# Patient Record
Sex: FEMALE | Race: BLACK OR AFRICAN AMERICAN | NOT HISPANIC OR LATINO | Employment: OTHER | URBAN - METROPOLITAN AREA
[De-identification: names, ages, dates, MRNs, and addresses within clinical notes are randomized per-mention and may not be internally consistent; named-entity substitution may affect disease eponyms.]

---

## 2018-01-01 ENCOUNTER — TELEPHONE (OUTPATIENT)
Dept: SURGERY | Facility: CLINIC | Age: 78
End: 2018-01-01

## 2018-01-01 ENCOUNTER — CLINICAL SUPPORT (OUTPATIENT)
Dept: CARDIOLOGY | Facility: CLINIC | Age: 78
DRG: 064 | End: 2018-01-01
Attending: PSYCHIATRY & NEUROLOGY
Payer: MEDICARE

## 2018-01-01 ENCOUNTER — HOSPITAL ENCOUNTER (INPATIENT)
Facility: HOSPITAL | Age: 78
LOS: 3 days | DRG: 064 | End: 2018-11-22
Attending: PSYCHIATRY & NEUROLOGY | Admitting: PSYCHIATRY & NEUROLOGY
Payer: MEDICARE

## 2018-01-01 VITALS
RESPIRATION RATE: 17 BRPM | HEIGHT: 65 IN | BODY MASS INDEX: 31.22 KG/M2 | SYSTOLIC BLOOD PRESSURE: 90 MMHG | HEART RATE: 107 BPM | TEMPERATURE: 98 F | WEIGHT: 187.38 LBS | DIASTOLIC BLOOD PRESSURE: 51 MMHG | OXYGEN SATURATION: 100 %

## 2018-01-01 DIAGNOSIS — J96.01 ACUTE HYPOXEMIC RESPIRATORY FAILURE: ICD-10-CM

## 2018-01-01 DIAGNOSIS — R56.9 SEIZURES: ICD-10-CM

## 2018-01-01 DIAGNOSIS — I63.89 ARTERIAL ISCHEMIC STROKE, MULTIFOCAL, MULTIPLE VASCULAR TERRITORIES, ACUTE: ICD-10-CM

## 2018-01-01 DIAGNOSIS — R79.89 ELEVATED TROPONIN: ICD-10-CM

## 2018-01-01 DIAGNOSIS — R56.9 SEIZURE: ICD-10-CM

## 2018-01-01 LAB
ACANTHOCYTES BLD QL SMEAR: PRESENT
ALBUMIN SERPL BCP-MCNC: 1.2 G/DL
ALBUMIN SERPL BCP-MCNC: 1.5 G/DL
ALLENS TEST: ABNORMAL
ALLENS TEST: ABNORMAL
ALP SERPL-CCNC: 70 U/L
ALP SERPL-CCNC: 72 U/L
ALT SERPL W/O P-5'-P-CCNC: 55 U/L
ALT SERPL W/O P-5'-P-CCNC: 98 U/L
AMORPH CRY UR QL COMP ASSIST: ABNORMAL
AMYLASE SERPL-CCNC: 141 U/L
ANION GAP SERPL CALC-SCNC: 12 MMOL/L
ANION GAP SERPL CALC-SCNC: 13 MMOL/L
ANISOCYTOSIS BLD QL SMEAR: SLIGHT
APTT BLDCRRT: 35.2 SEC
APTT BLDCRRT: 39.7 SEC
ASCENDING AORTA: 3.04 CM
AST SERPL-CCNC: 103 U/L
AST SERPL-CCNC: 138 U/L
AV INDEX (PROSTH): 0.85
AV MEAN GRADIENT: 2.17 MMHG
AV PEAK GRADIENT: 4 MMHG
AV VALVE AREA: 2.29 CM2
BACTERIA #/AREA URNS AUTO: ABNORMAL /HPF
BASO STIPL BLD QL SMEAR: ABNORMAL
BASOPHILS # BLD AUTO: 0.09 K/UL
BASOPHILS # BLD AUTO: 0.09 K/UL
BASOPHILS NFR BLD: 0 %
BASOPHILS NFR BLD: 0.6 %
BASOPHILS NFR BLD: 0.8 %
BILIRUB SERPL-MCNC: 1.5 MG/DL
BILIRUB SERPL-MCNC: 1.7 MG/DL
BILIRUB UR QL STRIP: NEGATIVE
BSA FOR ECHO PROCEDURE: 1.97 M2
BUN SERPL-MCNC: 43 MG/DL
BUN SERPL-MCNC: 48 MG/DL
BURR CELLS BLD QL SMEAR: ABNORMAL
CALCIUM SERPL-MCNC: 7.9 MG/DL
CALCIUM SERPL-MCNC: 8.1 MG/DL
CHLORIDE SERPL-SCNC: 120 MMOL/L
CHLORIDE SERPL-SCNC: 124 MMOL/L
CK MB SERPL-MCNC: 18.1 NG/ML
CK MB SERPL-MCNC: 21.5 NG/ML
CK MB SERPL-RTO: 0.8 %
CK MB SERPL-RTO: 1 %
CK SERPL-CCNC: 1310 U/L
CK SERPL-CCNC: 2165 U/L
CK SERPL-CCNC: 2188 U/L
CLARITY UR REFRACT.AUTO: ABNORMAL
CO2 SERPL-SCNC: 13 MMOL/L
CO2 SERPL-SCNC: 14 MMOL/L
COLOR UR AUTO: ABNORMAL
CORTIS SERPL-MCNC: 53.1 UG/DL
CREAT SERPL-MCNC: 2.2 MG/DL
CREAT SERPL-MCNC: 2.3 MG/DL
CV ECHO LV RWT: 0.35 CM
DELSYS: ABNORMAL
DELSYS: ABNORMAL
DIFFERENTIAL METHOD: ABNORMAL
DOHLE BOD BLD QL SMEAR: PRESENT
DOP CALC AO PEAK VEL: 1 M/S
DOP CALC AO VTI: 14.68 CM
DOP CALC LVOT AREA: 2.69 CM2
DOP CALC LVOT DIAMETER: 1.85 CM
DOP CALC LVOT STROKE VOLUME: 33.56 CM3
DOP CALCLVOT PEAK VEL VTI: 12.49 CM
ECHO LV POSTERIOR WALL: 0.82 CM (ref 0.6–1.1)
EOSINOPHIL # BLD AUTO: 0 K/UL
EOSINOPHIL # BLD AUTO: 0 K/UL
EOSINOPHIL NFR BLD: 0 %
ERYTHROCYTE [DISTWIDTH] IN BLOOD BY AUTOMATED COUNT: 15.9 %
ERYTHROCYTE [DISTWIDTH] IN BLOOD BY AUTOMATED COUNT: 16 %
ERYTHROCYTE [DISTWIDTH] IN BLOOD BY AUTOMATED COUNT: 16.4 %
ERYTHROCYTE [SEDIMENTATION RATE] IN BLOOD BY WESTERGREN METHOD: 14 MM/H
ERYTHROCYTE [SEDIMENTATION RATE] IN BLOOD BY WESTERGREN METHOD: 14 MM/H
EST. GFR  (AFRICAN AMERICAN): 22.5 ML/MIN/1.73 M^2
EST. GFR  (AFRICAN AMERICAN): 23.7 ML/MIN/1.73 M^2
EST. GFR  (NON AFRICAN AMERICAN): 19.5 ML/MIN/1.73 M^2
EST. GFR  (NON AFRICAN AMERICAN): 20.6 ML/MIN/1.73 M^2
FIBRINOGEN PPP-MCNC: 650 MG/DL
FIO2: 35
FIO2: 35
FOLATE SERPL-MCNC: 7.7 NG/ML
FRACTIONAL SHORTENING: 18 % (ref 28–44)
GIANT PLATELETS BLD QL SMEAR: PRESENT
GLUCOSE SERPL-MCNC: 206 MG/DL
GLUCOSE SERPL-MCNC: 93 MG/DL
GLUCOSE UR QL STRIP: ABNORMAL
GRAN CASTS UR QL COMP ASSIST: 0 /LPF
HAPTOGLOB SERPL-MCNC: 252 MG/DL
HCO3 UR-SCNC: 15.3 MMOL/L (ref 24–28)
HCO3 UR-SCNC: 15.4 MMOL/L (ref 24–28)
HCT VFR BLD AUTO: 35.2 %
HCT VFR BLD AUTO: 37.5 %
HCT VFR BLD AUTO: 37.8 %
HEPARIN INDUCED THROMBOCYTOPENIA: NORMAL
HGB BLD-MCNC: 12.3 G/DL
HGB BLD-MCNC: 12.8 G/DL
HGB BLD-MCNC: 12.8 G/DL
HGB UR QL STRIP: ABNORMAL
HYALINE CASTS UR QL AUTO: 1 /LPF
HYPOCHROMIA BLD QL SMEAR: ABNORMAL
IMM GRANULOCYTES # BLD AUTO: 0.1 K/UL
IMM GRANULOCYTES # BLD AUTO: 0.14 K/UL
IMM GRANULOCYTES # BLD AUTO: ABNORMAL K/UL
IMM GRANULOCYTES NFR BLD AUTO: 0.9 %
IMM GRANULOCYTES NFR BLD AUTO: 1 %
IMM GRANULOCYTES NFR BLD AUTO: ABNORMAL %
INR PPP: 1.2
INR PPP: 1.3
INTERVENTRICULAR SEPTUM: 0.9 CM (ref 0.6–1.1)
KETONES UR QL STRIP: NEGATIVE
LA MAJOR: 4.99 CM
LA MINOR: 4.73 CM
LA WIDTH: 3.61 CM
LACTATE SERPL-SCNC: 2.6 MMOL/L
LACTATE SERPL-SCNC: 2.8 MMOL/L
LDH SERPL L TO P-CCNC: 627 U/L
LEFT ATRIUM SIZE: 3.48 CM
LEFT ATRIUM VOLUME INDEX: 26.3 ML/M2
LEFT ATRIUM VOLUME: 51.86 CM3
LEFT INTERNAL DIMENSION IN SYSTOLE: 3.85 CM (ref 2.1–4)
LEFT VENTRICLE DIASTOLIC VOLUME INDEX: 37.92 ML/M2
LEFT VENTRICLE DIASTOLIC VOLUME: 74.71 ML
LEFT VENTRICLE MASS INDEX: 68.2 G/M2
LEFT VENTRICLE SYSTOLIC VOLUME INDEX: 32.5 ML/M2
LEFT VENTRICLE SYSTOLIC VOLUME: 63.97 ML
LEFT VENTRICULAR INTERNAL DIMENSION IN DIASTOLE: 4.7 CM (ref 3.5–6)
LEFT VENTRICULAR MASS: 134.37 G
LEUKOCYTE ESTERASE UR QL STRIP: ABNORMAL
LIPASE SERPL-CCNC: 13 U/L
LYMPHOCYTES # BLD AUTO: 0.4 K/UL
LYMPHOCYTES # BLD AUTO: 0.5 K/UL
LYMPHOCYTES NFR BLD: 3 %
LYMPHOCYTES NFR BLD: 4.3 %
LYMPHOCYTES NFR BLD: 9 %
MAGNESIUM SERPL-MCNC: 2.3 MG/DL
MAGNESIUM SERPL-MCNC: 2.3 MG/DL
MCH RBC QN AUTO: 29.7 PG
MCH RBC QN AUTO: 29.8 PG
MCH RBC QN AUTO: 30.9 PG
MCHC RBC AUTO-ENTMCNC: 33.9 G/DL
MCHC RBC AUTO-ENTMCNC: 34.1 G/DL
MCHC RBC AUTO-ENTMCNC: 34.9 G/DL
MCV RBC AUTO: 87 FL
MCV RBC AUTO: 88 FL
MCV RBC AUTO: 88 FL
MICROSCOPIC COMMENT: ABNORMAL
MODE: ABNORMAL
MODE: ABNORMAL
MONOCYTES # BLD AUTO: 0.5 K/UL
MONOCYTES # BLD AUTO: 0.6 K/UL
MONOCYTES NFR BLD: 3 %
MONOCYTES NFR BLD: 4 %
MONOCYTES NFR BLD: 4.4 %
NEUTROPHILS # BLD AUTO: 10 K/UL
NEUTROPHILS # BLD AUTO: 12.8 K/UL
NEUTROPHILS NFR BLD: 80 %
NEUTROPHILS NFR BLD: 89.6 %
NEUTROPHILS NFR BLD: 91.4 %
NEUTS BAND NFR BLD MANUAL: 8 %
NITRITE UR QL STRIP: NEGATIVE
NRBC BLD-RTO: 3 /100 WBC
OVALOCYTES BLD QL SMEAR: ABNORMAL
OVALOCYTES BLD QL SMEAR: ABNORMAL
PCO2 BLDA: 24.4 MMHG (ref 35–45)
PCO2 BLDA: 26.5 MMHG (ref 35–45)
PEEP: 5
PEEP: 5
PH SMN: 7.37 [PH] (ref 7.35–7.45)
PH SMN: 7.41 [PH] (ref 7.35–7.45)
PH UR STRIP: 5 [PH] (ref 5–8)
PHOSPHATE SERPL-MCNC: 4.4 MG/DL
PHOSPHATE SERPL-MCNC: 5.1 MG/DL
PLATELET # BLD AUTO: 61 K/UL
PLATELET # BLD AUTO: 67 K/UL
PLATELET # BLD AUTO: 95 K/UL
PLATELET BLD QL SMEAR: ABNORMAL
PMV BLD AUTO: 11.7 FL
PMV BLD AUTO: 12.5 FL
PMV BLD AUTO: 12.6 FL
PO2 BLDA: 131 MMHG (ref 80–100)
PO2 BLDA: 139 MMHG (ref 80–100)
POC BE: -10 MMOL/L
POC BE: -9 MMOL/L
POC SATURATED O2: 99 % (ref 95–100)
POC SATURATED O2: 99 % (ref 95–100)
POC TCO2: 16 MMOL/L (ref 23–27)
POC TCO2: 16 MMOL/L (ref 23–27)
POCT GLUCOSE: 109 MG/DL (ref 70–110)
POCT GLUCOSE: 136 MG/DL (ref 70–110)
POCT GLUCOSE: 186 MG/DL (ref 70–110)
POCT GLUCOSE: 211 MG/DL (ref 70–110)
POCT GLUCOSE: 211 MG/DL (ref 70–110)
POCT GLUCOSE: 32 MG/DL (ref 70–110)
POCT GLUCOSE: 74 MG/DL (ref 70–110)
POCT GLUCOSE: 92 MG/DL (ref 70–110)
POIKILOCYTOSIS BLD QL SMEAR: ABNORMAL
POIKILOCYTOSIS BLD QL SMEAR: SLIGHT
POIKILOCYTOSIS BLD QL SMEAR: SLIGHT
POLYCHROMASIA BLD QL SMEAR: ABNORMAL
POLYCHROMASIA BLD QL SMEAR: ABNORMAL
POTASSIUM SERPL-SCNC: 4.4 MMOL/L
POTASSIUM SERPL-SCNC: 4.7 MMOL/L
PROT SERPL-MCNC: 4.3 G/DL
PROT SERPL-MCNC: 4.3 G/DL
PROT UR QL STRIP: ABNORMAL
PROTHROMBIN TIME: 12.2 SEC
PROTHROMBIN TIME: 12.8 SEC
RA MAJOR: 4.68 CM
RA PRESSURE: 3 MMHG
RA WIDTH: 2.86 CM
RBC # BLD AUTO: 3.98 M/UL
RBC # BLD AUTO: 4.29 M/UL
RBC # BLD AUTO: 4.31 M/UL
RBC #/AREA URNS AUTO: 1 /HPF (ref 0–4)
RIGHT VENTRICULAR END-DIASTOLIC DIMENSION: 2.9 CM
RPR SER QL: NORMAL
SAMPLE: ABNORMAL
SAMPLE: ABNORMAL
SCHISTOCYTES BLD QL SMEAR: PRESENT
SINUS: 2.82 CM
SITE: ABNORMAL
SITE: ABNORMAL
SODIUM SERPL-SCNC: 146 MMOL/L
SODIUM SERPL-SCNC: 150 MMOL/L
SP GR UR STRIP: 1.02 (ref 1–1.03)
SP02: 100
SP02: 100
SQUAMOUS #/AREA URNS AUTO: 0 /HPF
STJ: 2.83 CM
TDI LATERAL: 0.07
TDI SEPTAL: 0.06
TDI: 0.07
TOXIC GRANULES BLD QL SMEAR: PRESENT
TRICUSPID ANNULAR PLANE SYSTOLIC EXCURSION: 2.08 CM
TROPONIN I SERPL DL<=0.01 NG/ML-MCNC: 1.03 NG/ML
UPPER ARTERIAL RIGHT ARM AXILLARY SYS MAX: 42 CM/S
UPPER ARTERIAL RIGHT ARM BRACHIAL SYS MAX: 78 CM/S
UPPER ARTERIAL RIGHT ARM RADIAL SYS MAX: 0 CM/S
UPPER ARTERIAL RIGHT ARM SUBCLAVIAN SYS MAX: 57 CM/S
UPPER ARTERIAL RIGHT ARM ULNAR SYS MAX: 60 CM/S
URN SPEC COLLECT METH UR: ABNORMAL
VANCOMYCIN SERPL-MCNC: 30 UG/ML
VIT B12 SERPL-MCNC: >2000 PG/ML
VT: 400
VT: 400
WBC # BLD AUTO: 10.54 K/UL
WBC # BLD AUTO: 11.13 K/UL
WBC # BLD AUTO: 13.97 K/UL
WBC #/AREA URNS AUTO: 2 /HPF (ref 0–5)
YEAST UR QL AUTO: ABNORMAL

## 2018-01-01 PROCEDURE — 81001 URINALYSIS AUTO W/SCOPE: CPT

## 2018-01-01 PROCEDURE — 99900035 HC TECH TIME PER 15 MIN (STAT)

## 2018-01-01 PROCEDURE — 93931 UPPER EXTREMITY STUDY: CPT | Mod: RT

## 2018-01-01 PROCEDURE — 85730 THROMBOPLASTIN TIME PARTIAL: CPT

## 2018-01-01 PROCEDURE — 93010 ELECTROCARDIOGRAM REPORT: CPT | Mod: ,,, | Performed by: INTERNAL MEDICINE

## 2018-01-01 PROCEDURE — 83605 ASSAY OF LACTIC ACID: CPT

## 2018-01-01 PROCEDURE — 63600175 PHARM REV CODE 636 W HCPCS: Performed by: PSYCHIATRY & NEUROLOGY

## 2018-01-01 PROCEDURE — C1751 CATH, INF, PER/CENT/MIDLINE: HCPCS

## 2018-01-01 PROCEDURE — 93005 ELECTROCARDIOGRAM TRACING: CPT

## 2018-01-01 PROCEDURE — 25000003 PHARM REV CODE 250: Performed by: PSYCHIATRY & NEUROLOGY

## 2018-01-01 PROCEDURE — 20000000 HC ICU ROOM

## 2018-01-01 PROCEDURE — 94761 N-INVAS EAR/PLS OXIMETRY MLT: CPT

## 2018-01-01 PROCEDURE — 37799 UNLISTED PX VASCULAR SURGERY: CPT

## 2018-01-01 PROCEDURE — 99900026 HC AIRWAY MAINTENANCE (STAT)

## 2018-01-01 PROCEDURE — 82550 ASSAY OF CK (CPK): CPT | Mod: 91

## 2018-01-01 PROCEDURE — 93931 UPPER EXTREMITY STUDY: CPT | Mod: 26,RT,, | Performed by: INTERNAL MEDICINE

## 2018-01-01 PROCEDURE — 36620 INSERTION CATHETER ARTERY: CPT | Mod: ,,, | Performed by: PSYCHIATRY & NEUROLOGY

## 2018-01-01 PROCEDURE — 99291 CRITICAL CARE FIRST HOUR: CPT | Mod: GC,,, | Performed by: PSYCHIATRY & NEUROLOGY

## 2018-01-01 PROCEDURE — 85007 BL SMEAR W/DIFF WBC COUNT: CPT

## 2018-01-01 PROCEDURE — 27200966 HC CLOSED SUCTION SYSTEM

## 2018-01-01 PROCEDURE — 83615 LACTATE (LD) (LDH) ENZYME: CPT

## 2018-01-01 PROCEDURE — 82607 VITAMIN B-12: CPT

## 2018-01-01 PROCEDURE — 85025 COMPLETE CBC W/AUTO DIFF WBC: CPT

## 2018-01-01 PROCEDURE — 84100 ASSAY OF PHOSPHORUS: CPT

## 2018-01-01 PROCEDURE — 25000003 PHARM REV CODE 250: Performed by: NURSE PRACTITIONER

## 2018-01-01 PROCEDURE — 94002 VENT MGMT INPAT INIT DAY: CPT

## 2018-01-01 PROCEDURE — 80202 ASSAY OF VANCOMYCIN: CPT

## 2018-01-01 PROCEDURE — 99222 1ST HOSP IP/OBS MODERATE 55: CPT | Mod: ,,, | Performed by: INTERNAL MEDICINE

## 2018-01-01 PROCEDURE — 87070 CULTURE OTHR SPECIMN AEROBIC: CPT

## 2018-01-01 PROCEDURE — 36415 COLL VENOUS BLD VENIPUNCTURE: CPT

## 2018-01-01 PROCEDURE — 85384 FIBRINOGEN ACTIVITY: CPT

## 2018-01-01 PROCEDURE — 82150 ASSAY OF AMYLASE: CPT

## 2018-01-01 PROCEDURE — 51702 INSERT TEMP BLADDER CATH: CPT

## 2018-01-01 PROCEDURE — 94003 VENT MGMT INPAT SUBQ DAY: CPT

## 2018-01-01 PROCEDURE — 83735 ASSAY OF MAGNESIUM: CPT

## 2018-01-01 PROCEDURE — 82746 ASSAY OF FOLIC ACID SERUM: CPT

## 2018-01-01 PROCEDURE — 84484 ASSAY OF TROPONIN QUANT: CPT

## 2018-01-01 PROCEDURE — 87205 SMEAR GRAM STAIN: CPT

## 2018-01-01 PROCEDURE — 82803 BLOOD GASES ANY COMBINATION: CPT

## 2018-01-01 PROCEDURE — 27000221 HC OXYGEN, UP TO 24 HOURS

## 2018-01-01 PROCEDURE — 25000003 PHARM REV CODE 250

## 2018-01-01 PROCEDURE — 82553 CREATINE MB FRACTION: CPT | Mod: 91

## 2018-01-01 PROCEDURE — 82553 CREATINE MB FRACTION: CPT

## 2018-01-01 PROCEDURE — 80053 COMPREHEN METABOLIC PANEL: CPT

## 2018-01-01 PROCEDURE — 63600175 PHARM REV CODE 636 W HCPCS: Performed by: NURSE PRACTITIONER

## 2018-01-01 PROCEDURE — 85610 PROTHROMBIN TIME: CPT

## 2018-01-01 PROCEDURE — 5A1945Z RESPIRATORY VENTILATION, 24-96 CONSECUTIVE HOURS: ICD-10-PCS | Performed by: PSYCHIATRY & NEUROLOGY

## 2018-01-01 PROCEDURE — 51701 INSERT BLADDER CATHETER: CPT

## 2018-01-01 PROCEDURE — 83010 ASSAY OF HAPTOGLOBIN QUANT: CPT

## 2018-01-01 PROCEDURE — 86022 PLATELET ANTIBODIES: CPT

## 2018-01-01 PROCEDURE — 36620 INSERTION CATHETER ARTERY: CPT

## 2018-01-01 PROCEDURE — 99223 1ST HOSP IP/OBS HIGH 75: CPT | Mod: ,,, | Performed by: PSYCHIATRY & NEUROLOGY

## 2018-01-01 PROCEDURE — 83690 ASSAY OF LIPASE: CPT

## 2018-01-01 PROCEDURE — 87040 BLOOD CULTURE FOR BACTERIA: CPT

## 2018-01-01 PROCEDURE — 82550 ASSAY OF CK (CPK): CPT

## 2018-01-01 PROCEDURE — 86592 SYPHILIS TEST NON-TREP QUAL: CPT

## 2018-01-01 PROCEDURE — 82533 TOTAL CORTISOL: CPT

## 2018-01-01 PROCEDURE — 85027 COMPLETE CBC AUTOMATED: CPT

## 2018-01-01 RX ORDER — INSULIN ASPART 100 [IU]/ML
1-10 INJECTION, SOLUTION INTRAVENOUS; SUBCUTANEOUS EVERY 6 HOURS PRN
Status: DISCONTINUED | OUTPATIENT
Start: 2018-01-01 | End: 2018-01-01

## 2018-01-01 RX ORDER — NOREPINEPHRINE BITARTRATE/D5W 4MG/250ML
0.02 PLASTIC BAG, INJECTION (ML) INTRAVENOUS CONTINUOUS
Status: DISCONTINUED | OUTPATIENT
Start: 2018-01-01 | End: 2018-01-01

## 2018-01-01 RX ORDER — SODIUM CHLORIDE, SODIUM LACTATE, POTASSIUM CHLORIDE, CALCIUM CHLORIDE 600; 310; 30; 20 MG/100ML; MG/100ML; MG/100ML; MG/100ML
INJECTION, SOLUTION INTRAVENOUS CONTINUOUS
Status: DISCONTINUED | OUTPATIENT
Start: 2018-01-01 | End: 2018-01-01

## 2018-01-01 RX ORDER — FAMOTIDINE 20 MG/1
20 TABLET, FILM COATED ORAL DAILY
Status: DISCONTINUED | OUTPATIENT
Start: 2018-01-01 | End: 2018-01-01

## 2018-01-01 RX ORDER — GLUCAGON 1 MG
1 KIT INJECTION
Status: DISCONTINUED | OUTPATIENT
Start: 2018-01-01 | End: 2018-01-01

## 2018-01-01 RX ORDER — VANCOMYCIN HCL IN 5 % DEXTROSE 1G/250ML
1000 PLASTIC BAG, INJECTION (ML) INTRAVENOUS ONCE
Status: COMPLETED | OUTPATIENT
Start: 2018-01-01 | End: 2018-01-01

## 2018-01-01 RX ORDER — PHENYLEPHRINE HCL IN 0.9% NACL 1 MG/10 ML
SYRINGE (ML) INTRAVENOUS
Status: DISPENSED
Start: 2018-01-01 | End: 2018-01-01

## 2018-01-01 RX ORDER — NOREPINEPHRINE BITARTRATE/D5W 4MG/250ML
PLASTIC BAG, INJECTION (ML) INTRAVENOUS
Status: COMPLETED
Start: 2018-01-01 | End: 2018-01-01

## 2018-01-01 RX ORDER — GLYCOPYRROLATE 0.2 MG/ML
0.1 INJECTION INTRAMUSCULAR; INTRAVENOUS EVERY 4 HOURS PRN
Status: DISCONTINUED | OUTPATIENT
Start: 2018-01-01 | End: 2018-01-01 | Stop reason: HOSPADM

## 2018-01-01 RX ORDER — FAMOTIDINE 20 MG/1
20 TABLET, FILM COATED ORAL 2 TIMES DAILY
Status: DISCONTINUED | OUTPATIENT
Start: 2018-01-01 | End: 2018-01-01

## 2018-01-01 RX ORDER — LORAZEPAM 2 MG/ML
1 INJECTION INTRAMUSCULAR
Status: DISCONTINUED | OUTPATIENT
Start: 2018-01-01 | End: 2018-01-01 | Stop reason: HOSPADM

## 2018-01-01 RX ORDER — METFORMIN HYDROCHLORIDE 1000 MG/1
1000 TABLET ORAL 2 TIMES DAILY WITH MEALS
COMMUNITY

## 2018-01-01 RX ORDER — CHLORHEXIDINE GLUCONATE ORAL RINSE 1.2 MG/ML
15 SOLUTION DENTAL 2 TIMES DAILY
Status: DISCONTINUED | OUTPATIENT
Start: 2018-01-01 | End: 2018-01-01

## 2018-01-01 RX ORDER — HEPARIN SODIUM 5000 [USP'U]/ML
5000 INJECTION, SOLUTION INTRAVENOUS; SUBCUTANEOUS EVERY 8 HOURS
Status: DISCONTINUED | OUTPATIENT
Start: 2018-01-01 | End: 2018-01-01

## 2018-01-01 RX ADMIN — Medication 0.09 MCG/KG/MIN: at 06:11

## 2018-01-01 RX ADMIN — FAMOTIDINE 20 MG: 20 TABLET ORAL at 11:11

## 2018-01-01 RX ADMIN — INSULIN ASPART 2 UNITS: 100 INJECTION, SOLUTION INTRAVENOUS; SUBCUTANEOUS at 12:11

## 2018-01-01 RX ADMIN — CHLORHEXIDINE GLUCONATE 0.12% ORAL RINSE 15 ML: 1.2 LIQUID ORAL at 09:11

## 2018-01-01 RX ADMIN — PIPERACILLIN AND TAZOBACTAM 4.5 G: 4; .5 INJECTION, POWDER, LYOPHILIZED, FOR SOLUTION INTRAVENOUS; PARENTERAL at 06:11

## 2018-01-01 RX ADMIN — CHLORHEXIDINE GLUCONATE 0.12% ORAL RINSE 15 ML: 1.2 LIQUID ORAL at 10:11

## 2018-01-01 RX ADMIN — PIPERACILLIN AND TAZOBACTAM 4.5 G: 4; .5 INJECTION, POWDER, LYOPHILIZED, FOR SOLUTION INTRAVENOUS; PARENTERAL at 03:11

## 2018-01-01 RX ADMIN — INSULIN ASPART 4 UNITS: 100 INJECTION, SOLUTION INTRAVENOUS; SUBCUTANEOUS at 05:11

## 2018-01-01 RX ADMIN — GLYCOPYRROLATE 0.1 MG: 0.2 INJECTION INTRAMUSCULAR; INTRAVENOUS at 02:11

## 2018-01-01 RX ADMIN — FAMOTIDINE 20 MG: 20 TABLET ORAL at 10:11

## 2018-01-01 RX ADMIN — Medication 0.02 MCG/KG/MIN: at 11:11

## 2018-01-01 RX ADMIN — HEPARIN SODIUM 5000 UNITS: 5000 INJECTION, SOLUTION INTRAVENOUS; SUBCUTANEOUS at 04:11

## 2018-01-01 RX ADMIN — LORAZEPAM 1 MG: 2 INJECTION, SOLUTION INTRAMUSCULAR; INTRAVENOUS at 02:11

## 2018-01-01 RX ADMIN — SODIUM CHLORIDE, SODIUM LACTATE, POTASSIUM CHLORIDE, AND CALCIUM CHLORIDE 1000 ML: .6; .31; .03; .02 INJECTION, SOLUTION INTRAVENOUS at 03:11

## 2018-01-01 RX ADMIN — SODIUM CHLORIDE, SODIUM LACTATE, POTASSIUM CHLORIDE, AND CALCIUM CHLORIDE: .6; .31; .03; .02 INJECTION, SOLUTION INTRAVENOUS at 04:11

## 2018-01-01 RX ADMIN — PIPERACILLIN AND TAZOBACTAM 4.5 G: 4; .5 INJECTION, POWDER, LYOPHILIZED, FOR SOLUTION INTRAVENOUS; PARENTERAL at 02:11

## 2018-01-01 RX ADMIN — VANCOMYCIN HYDROCHLORIDE 1000 MG: 1 INJECTION, POWDER, LYOPHILIZED, FOR SOLUTION INTRAVENOUS at 06:11

## 2018-01-01 RX ADMIN — HUMAN ALBUMIN MICROSPHERES AND PERFLUTREN 3 ML: 10; .22 INJECTION, SOLUTION INTRAVENOUS at 10:11

## 2018-01-01 RX ADMIN — Medication 0.05 MCG/KG/MIN: at 09:11

## 2018-11-08 PROBLEM — R55 SYNCOPE: Status: ACTIVE | Noted: 2018-01-01

## 2018-11-09 PROBLEM — E87.20 LACTIC ACIDOSIS: Status: ACTIVE | Noted: 2018-01-01

## 2018-11-09 PROBLEM — R56.9 SEIZURE: Status: ACTIVE | Noted: 2018-01-01

## 2018-11-09 PROBLEM — N17.9 AKI (ACUTE KIDNEY INJURY): Status: ACTIVE | Noted: 2018-01-01

## 2018-11-09 PROBLEM — I82.411 ACUTE DEEP VEIN THROMBOSIS (DVT) OF FEMORAL VEIN OF RIGHT LOWER EXTREMITY: Status: ACTIVE | Noted: 2018-01-01

## 2018-11-09 PROBLEM — I25.10 CORONARY ARTERY DISEASE INVOLVING NATIVE CORONARY ARTERY WITHOUT ANGINA PECTORIS: Chronic | Status: ACTIVE | Noted: 2018-01-01

## 2018-11-09 PROBLEM — I26.99 ACUTE PULMONARY EMBOLISM: Status: ACTIVE | Noted: 2018-01-01

## 2018-11-09 PROBLEM — I50.22 CHRONIC SYSTOLIC HEART FAILURE: Chronic | Status: ACTIVE | Noted: 2018-01-01

## 2018-11-09 PROBLEM — E11.49 TYPE 2 DIABETES MELLITUS WITH NEUROLOGIC COMPLICATION, WITHOUT LONG-TERM CURRENT USE OF INSULIN: Chronic | Status: ACTIVE | Noted: 2018-01-01

## 2018-11-11 PROBLEM — D64.9 ACUTE ON CHRONIC ANEMIA: Status: ACTIVE | Noted: 2018-01-01

## 2018-11-16 PROBLEM — R57.9 CIRCULATORY FAILURE: Status: ACTIVE | Noted: 2018-01-01

## 2018-11-16 PROBLEM — D62 ACUTE BLOOD LOSS ANEMIA: Status: ACTIVE | Noted: 2018-01-01

## 2018-11-16 PROBLEM — R74.01 TRANSAMINITIS: Status: ACTIVE | Noted: 2018-01-01

## 2018-11-16 PROBLEM — J96.01 ACUTE HYPOXEMIC RESPIRATORY FAILURE: Status: ACTIVE | Noted: 2018-01-01

## 2018-11-16 NOTE — TELEPHONE ENCOUNTER
Nurse Quintana advised MD is out until Monday. Nurse to check urgency of consult with requesting physician and will inform the department if consult will wait until Monday or if someone else will be called to consult.

## 2018-11-16 NOTE — TELEPHONE ENCOUNTER
----- Message from Skinny Martinez sent at 11/16/2018 12:34 PM CST -----  Contact: [facility], Lilian Quintana [facility] hospital consult please call back at [phone]

## 2018-11-20 PROBLEM — R23.9 ALTERATION IN SKIN INTEGRITY: Status: ACTIVE | Noted: 2018-01-01

## 2018-11-20 PROBLEM — R79.89 ELEVATED SERUM CREATININE: Status: ACTIVE | Noted: 2018-01-01

## 2018-11-20 PROBLEM — R79.89 ELEVATED TROPONIN: Status: ACTIVE | Noted: 2018-01-01

## 2018-11-20 PROBLEM — I63.89 ARTERIAL ISCHEMIC STROKE, MULTIFOCAL, MULTIPLE VASCULAR TERRITORIES, ACUTE: Status: ACTIVE | Noted: 2018-01-01

## 2018-11-20 NOTE — CONSULTS
[facility]  Cardiology  Consult Note    Patient Name: Phyllis Dumont  MRN: [id number]  Admission Date: 11/19/2018  Hospital Length of Stay: 1 days  Code Status: Full Code   Attending Provider: Alireza Seay MD   Consulting Provider: Cindy Choudhury MD  Primary Care Physician: Tiffany Gómez NP  Principal Problem:Seizure    Patient information was obtained from past medical records and ER records.     Inpatient consult to Cardiology  Consult performed by: Cindy Choudhury MD  Consult ordered by: Magen Jackson NP  Reason for consult: Elevated troponin after recent LHC + stents        Subjective:     HPI:   Consult: Elevated troponin    Ms. Dumont is an 78F with HFpEF (EF 50-55%), DM2, HTN, DVT who initially presented to [facility] on 11/9 for evaluation of syncope. During her workup for syncope, she was found to have a small non-occlusive PE in the R pulmonary artery and an abnormal nuclear stress EKG showing reversible ischemia with wall-motion abnormalities. She underwent LHC with access via the R radial artery on 11/15 with 4x PARAS placed in the RCA, LCX, and PDA. 3-4 hours after returning from the cath lab, she became unresponsive and hypotensive requiring intubation and pressor support. She was found to have a L arm and L flank hematoma, Hb of 3.9, and was transfused 5U pRBCs. She became thrombocytopenic with platelets in the 70s from 193 on admit. She is not currently on any anticoagulation due to thrombocytopenia.    She was transferred to [facility] for EEG monitoring for status epilepticus where she was found to have elevated troponin to 1.41->1.12->1.026     Past Medical History:   Diagnosis Date    CHF (congestive heart failure)     Diabetes mellitus     Hypertension        Past Surgical History:   Procedure Laterality Date    Left heart cath Left 11/15/2018    Performed by Harmeet Carlos MD at [facility]    LEFT HEART CATHETERIZATION Left  11/15/2018    Procedure: Left heart cath;  Surgeon: Harmeet Carlos MD;  Location: [facility] CATH LAB;  Service: Cardiology;  Laterality: Left;    Percutaneous coronary intervention N/A 11/15/2018    Performed by Harmeet Carlos MD at [facility] CATH LAB       Review of patient's allergies indicates:  No Known Allergies    Current Facility-Administered Medications on File Prior to Encounter   Medication    [DISCONTINUED] 0.9%  NaCl infusion    [DISCONTINUED] acetaminophen tablet 500 mg    [DISCONTINUED] acetaminophen tablet 650 mg    [DISCONTINUED] chlorhexidine 0.12 % solution 15 mL    [DISCONTINUED] clopidogrel tablet 75 mg    [DISCONTINUED] cyanocobalamin injection 100 mcg    [DISCONTINUED] dextrose 50% injection 12.5 g    [DISCONTINUED] dextrose 50% injection 25 g    [DISCONTINUED] diphenhydrAMINE 12.5 mg/5 mL elixir 25 mg    [DISCONTINUED] folic acid tablet 1 mg    [DISCONTINUED] glucagon (human recombinant) injection 1 mg    [DISCONTINUED] glucose chewable tablet 16 g    [DISCONTINUED] glucose chewable tablet 24 g    [DISCONTINUED] insulin aspart U-100 pen 1-10 Units    [DISCONTINUED] naloxone injection 1 mg    [DISCONTINUED] ondansetron disintegrating tablet 4 mg    [DISCONTINUED] ondansetron injection 4 mg    [DISCONTINUED] pantoprazole injection 40 mg    [DISCONTINUED] piperacillin-tazobactam 2.25 g in dextrose 5 % 50 mL IVPB (ready to mix system)    [DISCONTINUED] potassium chloride 10% oral solution 40 mEq    [DISCONTINUED] valproate (DEPACON) 430 mg in dextrose 5 % 50 mL IVPB    [DISCONTINUED] vasopressin (PITRESSIN) 0.2 Units/mL in dextrose 5 % 100 mL infusion     Current Outpatient Medications on File Prior to Encounter   Medication Sig    metFORMIN (GLUCOPHAGE) 1000 MG tablet Take 1,000 mg by mouth 2 (two) times daily with meals.    aspirin (ECOTRIN) 81 MG EC tablet Take 81 mg by mouth once daily.    atorvastatin (LIPITOR) 40 MG tablet Take 40 mg by mouth once daily.    carvedilol  (COREG) 6.25 MG tablet Take 6.25 mg by mouth 2 (two) times daily with meals.    clopidogrel (PLAVIX) 75 mg tablet Take 75 mg by mouth once daily.    furosemide (LASIX) 40 MG tablet Take 40 mg by mouth 2 (two) times daily.    sacubitril-valsartan (ENTRESTO) 24-26 mg per tablet Take 1 tablet by mouth 2 (two) times daily.     Family History     None        Tobacco Use    Smoking status: Never Smoker   Substance and Sexual Activity    Alcohol use: No     Frequency: Never    Drug use: No    Sexual activity: Not Currently     Review of Systems   Unable to perform ROS: intubated     Objective:     Vital Signs (Most Recent):  Temp: 98.7 °F (37.1 °C) (11/20/18 1105)  Pulse: 99 (11/20/18 1300)  Resp: (!) 21 (11/20/18 1300)  BP: (!) 101/55 (11/20/18 1300)  SpO2: 100 % (11/20/18 1300) Vital Signs (24h Range):  Temp:  [98 °F (36.7 °C)-98.7 °F (37.1 °C)] 98.7 °F (37.1 °C)  Pulse:  [] 99  Resp:  [0-29] 21  SpO2:  [83 %-100 %] 100 %  BP: ()/(53-68) 101/55  Arterial Line BP: (100-124)/(50-72) 111/60     Weight: 85 kg (187 lb 6.3 oz)  Body mass index is 31.18 kg/m².    SpO2: 100 %  O2 Device (Oxygen Therapy): ventilator      Intake/Output Summary (Last 24 hours) at 11/20/2018 1400  Last data filed at 11/20/2018 1300  Gross per 24 hour   Intake 2153.91 ml   Output 510 ml   Net 1643.91 ml       Lines/Drains/Airways     Central Venous Catheter Line                 Percutaneous Central Line Insertion/Assessment - triple lumen  11/15/18 1915 right femoral vein;right femoral 4 days          Drain                 NG/OG Tube 11/15/18 2000 18 Fr. Center mouth 4 days         Rectal Tube 11/19/18 0758 rectal tube w/ balloon (indicate number of mLs) 1 day         Urethral Catheter 11/20/18 1245 less than 1 day          Airway                 Airway - Non-Surgical 11/16/18 Endotracheal Tube 4 days          Arterial Line                 Arterial Line 11/20/18 0100 Left Radial less than 1 day                Physical Exam   Eyes:  Conjunctivae are normal. No scleral icterus.   Neck: No thyromegaly present.   Cardiovascular: Normal rate, regular rhythm and normal heart sounds.   No murmur heard.  Pulmonary/Chest:   Patient intubated   Abdominal: Soft. Bowel sounds are normal. She exhibits no distension. There is no tenderness.   Musculoskeletal: She exhibits edema (Bilat UE and LE (3+ pitting up to knees)). She exhibits no tenderness.   Lymphadenopathy:     She has no cervical adenopathy.   Skin: She is diaphoretic.   Nursing note and vitals reviewed.      Significant Labs:   Blood Culture: No results for input(s): LABBLOO in the last 48 hours., CMP   Recent Labs   Lab 18  2344 18  0531 18  0201    145 150*   K 4.4 3.9 4.4   * 117* 124*   CO2 16* 17* 14*   * 219* 93   BUN 41* 41* 43*   CREATININE 2.2* 2.3* 2.2*   CALCIUM 6.8* 6.9* 7.9*   PROT 4.2* 4.3* 4.3*   ALBUMIN 2.1* 1.9* 1.5*   BILITOT 1.6* 1.4* 1.5*   ALKPHOS 51 52 70   * 159* 138*   * 131* 98*   ANIONGAP 11 11 12   ESTGFRAFRICA 23.7* 22.5* 23.7*   EGFRNONAA 20.6* 19.5* 20.6*   , CBC   Recent Labs   Lab 18  0531  18  1526 18  0201 18  1119   WBC 14.00*  --   --  10.54 11.13   HGB 11.8*  11.8*   < > 12.0 12.3 12.8   HCT 34.9*  34.9*   < > 35.9* 35.2* 37.5   PLT 69*  --   --  61* 67*    < > = values in this interval not displayed.   , INR   Recent Labs   Lab 18  0201   INR 1.2    and Troponin   Recent Labs   Lab 18  2344 18  0201   TROPONINI 1.12* 1.026*       Significant Imagin2018 EKG:  Normal sinus rhythm  Low voltage QRS  Cannot rule out Inferior infarct ,age undetermined  ST and T wave abnormality, consider anterolateral ischemia    2018 TTE:  · Low normal left ventricular systolic function. The estimated ejection fraction is 50-55% (technically difficult)  · Normal LV diastolic function.  · Normal right ventricular systolic function.        Assessment and Plan:      Elevated troponin    Patient is an 78F with elevated troponin level in setting of recent LHC and placement of PARAS x4 complicated post-procedurally by a spontaneous retroperitoneal bleed (reported radial access), associated shock and multi-organ system dysfunction (JAIME, hepatic impairment, poor mentation, and elevated troponin, and hemolysis suggestive of DIC or other microangiopathic hemolytic anemia).     The elevated troponin is not associated with new EKG changes, and is not out of proportion for the multi-organ injury that the patient is in right now. The multiple infarcts on MRI suggest a complicating hypercoagulable state (MAGALY may be a possibility). Together this does not suggest a primary cardiac etiology, however, patient is at extremely high risk for in-stent thrombosis.    Recommendations:  - Would recommend starting dual antiplatelet therapy as soon as possible to prevent stent thrombosis  - can consider HIT workup, as this would prove helpful in selecting anti-coagulants should the patient stabilize enough for this  - given the degree of troponins, and associated multi-organ failure, the prognosis is poor  - will sign off, thank you for the consult, please call back with any questions         VTE Risk Mitigation (From admission, onward)        Ordered     Place sequential compression device  Until discontinued      11/19/18 5040          Thank you for your consult. I will sign off. Please contact us if you have any additional questions.    Cindy Choudhury MD  Cardiology   [facility]    The vitals, medications, laboratory results, resident documentation have been reviewed by me, I have also independently reviewed all imaging/tracings obtained, and examined the patient as well as discussed findings an plan with patient, and agree with the resident plan above with any additional changes made to the note.     Helio Ohara  PGY-IV Cardiovascular Medicine

## 2018-11-20 NOTE — PLAN OF CARE
Problem: Patient Care Overview  Goal: Plan of Care Review  Outcome: Ongoing (interventions implemented as appropriate)  POC reviewed with pt and pt's daughter at 0500. Pt unresponsive and unable to verbalize understanding. Pt's daughter verbalized understanding. Questions and concerns addressed. Pt transported to CT overnight with RN, PCT and RTx2. Pt tolerated the procedure well. Pt returned to [facility] 9079. Pt noted to have numerous blisters and skin tears. Barrier ointment applied and wound care consulted. Pt progressing toward goals. Will continue to monitor. See flowsheets for full assessment and VS info.

## 2018-11-20 NOTE — ASSESSMENT & PLAN NOTE
No AEDs at this time  Reconnect to EEG for further monitoring.   EEG cap no seizures detected during its course.

## 2018-11-20 NOTE — ASSESSMENT & PLAN NOTE
Patient is an 78F with elevated troponin level in setting of recent LHC and placement of PARAS x4 complicated post-procedurally by a spontaneous retroperitoneal bleed (reported radial access), associated shock and multi-organ system dysfunction (JAIME, hepatic impairment, poor mentation, and elevated troponin, and hemolysis suggestive of DIC or other microangiopathic hemolytic anemia).     The elevated troponin is not associated with new EKG changes, and is not out of proportion for the multi-organ injury that the patient is in right now. The multiple infarcts on MRI suggest a complicating hypercoagulable state (MAGALY may be a possibility). Together this does not suggest a primary cardiac etiology, however, patient is at extremely high risk for in-stent thrombosis.    Recommendations:  - Would recommend starting dual antiplatelet therapy as soon as possible to prevent stent thrombosis  - can consider HIT workup, as this would prove helpful in selecting anti-coagulants should the patient stabilize enough for this  - given the degree of troponins, and associated multi-organ failure, the prognosis is poor  - will sign off, thank you for the consult, please call back with any questions

## 2018-11-20 NOTE — PROGRESS NOTES
Wound care from nursing for abdomen, pelvic folds and right breast. Pt admitted with seizures. Upon assessment with nurse noted multiple scattered intact and unroofed blisters noted to abdomen, left arm, right breast and skin tears to the left and right labia. Also, noted moisture to right antecubital fossa from weeping. Pt edematous to bilateral upper and lower extremities.     Recommendation:   -Labia: moisture barrier BID to prevent further skin breakdown  -To intact and unroofed blisters: apply silicone border foam dressings PRN saturation to manage drainage from weeping skin. Nursing to utilize coviden pads to manage drainage if more frequent dressing changes are required due to saturation    Wound care team to follow PRN      11/20/18 1555       Wound 11/20/18 0705 Skin Tear Labia   Date First Assessed/Time First Assessed: 11/20/18 0705   Pre-existing: Yes  Primary Wound Type: Skin Tear  Side: (c)   Location: Labia   Wound Image    Wound WDL ex   Dressing Appearance Open to air   Drainage Amount Scant   Drainage Characteristics/Odor Clear   Appearance Pink;Red   Tissue loss description Partial thickness   Periwound Area Moist   Wound Edges Open       Wound 11/20/18 0705 Blister(s) Abdomen   Date First Assessed/Time First Assessed: 11/20/18 0705   Pre-existing: Yes  Primary Wound Type: (c) Blister(s)  Side: Left  Location: Abdomen   Wound Image    Wound WDL ex   Dressing Appearance Open to air   Drainage Amount Small   Drainage Characteristics/Odor Serosanguineous   Appearance Pink;Red   Tissue loss description Partial thickness   Wound Edges Open   Wound Length (cm) 4 cm   Wound Width (cm) 9 cm   Wound Depth (cm) 0.1 cm   Wound Volume (cm^3) 3.6 cm^3   Wound Surface Area (cm^2) 36 cm^2   Dressing Foam       Wound 11/20/18 0705 Blister(s) lower Breast   Date First Assessed/Time First Assessed: 11/20/18 0705   Pre-existing: Yes  Primary Wound Type: Blister(s)  Side: Right  Orientation: lower  Location: (c)  Breast   Wound Image    Wound WDL ex   Dressing Appearance Open to air   Drainage Amount Small   Drainage Characteristics/Odor Serosanguineous   Appearance Pink;Red   Tissue loss description Partial thickness   Periwound Area Moist   Wound Edges Open   Wound Length (cm) 2.3 cm   Wound Width (cm) 4.5 cm   Wound Depth (cm) 0.1 cm   Wound Volume (cm^3) 1.03 cm^3   Wound Surface Area (cm^2) 10.35 cm^2   Dressing Foam       Wound 11/20/18 0705 Blister(s) upper Arm   Date First Assessed/Time First Assessed: 11/20/18 0705   Pre-existing: Yes  Primary Wound Type: Blister(s)  Side: Left  Orientation: upper  Location: Arm   Wound Image    Wound WDL ex   Dressing Appearance Open to air   Drainage Amount Small   Drainage Characteristics/Odor Serosanguineous   Appearance Pink;Red   Tissue loss description Partial thickness   Periwound Area Moist   Wound Edges Open   Wound Length (cm) 8 cm   Wound Width (cm) 6 cm   Wound Depth (cm) 0.1 cm   Wound Volume (cm^3) 4.8 cm^3   Wound Surface Area (cm^2) 48 cm^2   Dressing Foam

## 2018-11-20 NOTE — PROGRESS NOTES
Transported patient to CT on trasnport vent. Patient tolerated well. Patient placed back on previous settings.

## 2018-11-20 NOTE — H&P
"  [facility]  Neurocritical Care  History & Physical    Admit Date: 11/19/2018  Service Date: 11/20/2018  Length of Stay: 1    Subjective:     Chief Complaint: altered mental status - seizures?    History of Present Illness: 78 y F with mh of congestive heart failure, DM2, HTN who presents as transfer from [facility].    On 11/9, she presented to [facility] for seizure vs syncope. Evaluation in the emergency department revealed an elevated D-dimer and lactic acidosis.  Ultrasound of the lower extremities found lower extremity DVT inthe distal right femoral vein extending into and through the popliteal vein into the posterior tibial vein. Subsequent CTA revealed a nonocclusive pulmonary artery embolus. Patient admitted and anticoagulation initiated.  Evaluated by both Cardiology and Neurology.   Neurology had working diagnosis of recurrent spells suggestive of complex partial sz and generalized sz. Patient Initiated on depakote.   Cardiology ordered an echo which was normal EF.   Nuclear medicine stress test with reversible ischemia  On 11- a LHC was done. Since high grade stenosis was found in multiple coronary vessels, 4 stents were placed as below:  · Non-obstructed disease in the LAD  · HIgh-grade stenoses in the proximal and ayz-nd-oqexqg RCA successfully treated with PCI/two PARAS  · High-grade stenosis of the proximal PDA successfully treated with PCI/PARAS  · Hifh grade stenosis in the mid LCX successfully treated with PCI/PARAS  · Estimated blood loss: <50 mL     After cardiac stenting procedure, she had episode of unresponsiveness that was witnessed by the PACU nurse with stable HR, breathing, and BP but she was "staring off into space" and unresponsive. After transfer to the telemetry unit a rapid response called and patient had worsening responsivness, She was also hypotensive.  patient was intubated for unresponsive state. She was transferred to ICU and was started on " Levophed and Phenylnephrine. Her hospital course prior was complicated by hematoma in arm and anemia. Anemia worsened overnight and she was transfused with a total of 5 units PRBCs and stabilized. CT chest/head/& abdomen with findings of left anterior renal intraperitoneal soft tissue mass most consistent with hematoma given her clinical findings. Hgb/Hct stabilized, serial Hgb/Hct ongoing.      Neurological exam documented at OSH:  11/17/2018:  Remains intubated and critically ill though more stable than yesterday. She is now responding to name and following simple commands.   11-:  Remains critically ill, intubated on Vasopressin. Not opening eyes today to verbal stimuli or moving hands. Not on and sedating agents. Hgb/Hct again decreased. CT abdomen yesterday showed decreasing hematoma. She is not on any thinners outside of Plavix. UOP is marginal at best ( 30 ml/hr). Insulin gtt weaned off with stable glucoses and close following with SSI as needed. Enteral feedings ongoing via OGT. Remains on the vent on AC mode rate 16 and breathing over rate. Plan for potassium replacement and transfusion of 2 units PRBC. No active evidence of bleeding noted. Close following ongoing.   H&H appears stabilizing.        11/19/2018Patient was more responsive over we can but is minimally responsive today.  She is minimally responsive to sternal rub.  Currently not on sedatives but does open eyes with what appears to be right upper gaze. Staff reported again what might have appeared like seizure activity this morning with right upper gaze twitching mouth. Pupils are reactive.  Patient does appear to have corneal reflexes.  She is breathing over the vent     Transfer to neuro intensive care unit where EEG services are available    Past Medical History:   Diagnosis Date    CHF (congestive heart failure)     Diabetes mellitus     Hypertension      Past Surgical History:   Procedure Laterality Date    Left heart cath Left  11/15/2018    Performed by Harmeet Carlos MD at [facility] CATH LAB    LEFT HEART CATHETERIZATION Left 11/15/2018    Procedure: Left heart cath;  Surgeon: Harmeet Carlos MD;  Location: [facility] CATH LAB;  Service: Cardiology;  Laterality: Left;    Percutaneous coronary intervention N/A 11/15/2018    Performed by Harmeet Carlos MD at [facility] CATH LAB      Current Facility-Administered Medications on File Prior to Encounter   Medication Dose Route Frequency Provider Last Rate Last Dose    [DISCONTINUED] 0.9%  NaCl infusion (for blood administration)   Intravenous Q24H PRN Nishant Hernandez MD        [DISCONTINUED] 0.9%  NaCl infusion (for blood administration)   Intravenous Q24H PRN Tiffany Gómez NP        [DISCONTINUED] 0.9%  NaCl infusion   Intravenous Continuous Roni Degroot  mL/hr at 11/19/18 1701      [DISCONTINUED] acetaminophen tablet 500 mg  500 mg Oral On Call Procedure Tiffany Gómez NP        [DISCONTINUED] acetaminophen tablet 650 mg  650 mg Oral Q6H PRN Roni Degroot MD   650 mg at 11/14/18 2125    [DISCONTINUED] chlorhexidine 0.12 % solution 15 mL  15 mL Mouth/Throat BID Roni Degroot MD   15 mL at 11/19/18 0945    [DISCONTINUED] clopidogrel tablet 75 mg  75 mg Oral Daily Tiffany Gómez NP   75 mg at 11/19/18 0946    [DISCONTINUED] cyanocobalamin injection 100 mcg  100 mcg Intramuscular Daily Tiffany Gómez NP   100 mcg at 11/19/18 0945    [DISCONTINUED] dexmedetomidine (PRECEDEX) 400mcg/100mL 0.9% NaCL infusion  0.2 mcg/kg/hr Intravenous Continuous Varsha Buckner MD   Stopped at 11/17/18 1600    [DISCONTINUED] dextrose 50% injection 12.5 g  12.5 g Intravenous PRN Tiffany Gómez NP        [DISCONTINUED] dextrose 50% injection 12.5 g  12.5 g Intravenous PRN Roni Degroot MD        [DISCONTINUED] dextrose 50% injection 25 g  25 g Intravenous PRN Tiffany Gómez NP        [DISCONTINUED] dextrose 50% injection 25 g  25 g Intravenous PRN Roni Degroot MD         [DISCONTINUED] diphenhydrAMINE 12.5 mg/5 mL elixir 25 mg  25 mg Oral On Call Procedure Tiffany Gómez NP        [DISCONTINUED] fentaNYL injection 25 mcg  25 mcg Intravenous Q30 Min PRN Nishant Hernandez MD   25 mcg at 11/16/18 2328    [DISCONTINUED] folic acid tablet 1 mg  1 mg Oral Daily Tiffany Gómez NP   1 mg at 11/19/18 0946    [DISCONTINUED] glucagon (human recombinant) injection 1 mg  1 mg Intramuscular PRN Tiffany Gómez NP        [DISCONTINUED] glucagon (human recombinant) injection 1 mg  1 mg Intramuscular PRN Roni Degroot MD        [DISCONTINUED] glucose chewable tablet 16 g  16 g Oral PRN Tiffany Gómez NP        [DISCONTINUED] glucose chewable tablet 16 g  16 g Oral PRN Roni Degroot MD        [DISCONTINUED] glucose chewable tablet 24 g  24 g Oral PRN Tiffany Gómez NP        [DISCONTINUED] glucose chewable tablet 24 g  24 g Oral PRN Roni Degroot MD        [DISCONTINUED] insulin aspart U-100 pen 0-5 Units  0-5 Units Subcutaneous QID (AC + HS) PRN Tiffany Gómez NP   1 Units at 11/18/18 2215    [DISCONTINUED] insulin aspart U-100 pen 1-10 Units  1-10 Units Subcutaneous QID (AC + HS) PRN Roni Degroot MD   6 Units at 11/19/18 1314    [DISCONTINUED] meropenem 1 g in sodium chloride 0.9 % 100 mL IVPB (ready to mix system)  1 g Intravenous Q12H Roni Degroot  mL/hr at 11/19/18 1001 1 g at 11/19/18 1001    [DISCONTINUED] naloxone injection 1 mg  1 mg Intravenous Once Roni Degroot MD   Stopped at 11/15/18 2000    [DISCONTINUED] NORepinephrine bitartrate 8 mg in dextrose 5% 250 mL infusion  0.02 mcg/kg/min Intravenous Continuous Roni Degroot MD   Stopped at 11/18/18 0015    [DISCONTINUED] ondansetron disintegrating tablet 4 mg  4 mg Oral Q6H PRN Roni Degroot MD   4 mg at 11/15/18 0043    [DISCONTINUED] ondansetron injection 4 mg  4 mg Intravenous Q12H PRN Harmeet Carlos MD   4 mg at 11/15/18 1125    [DISCONTINUED] pantoprazole injection 40 mg   40 mg Intravenous Daily Nishant Hernandez MD   40 mg at 11/19/18 0945    [DISCONTINUED] phenylephrine (HONG-SYNEPHRINE) 100 mg in sodium chloride 0.9% 250 mL infusion  0.5 mcg/kg/min Intravenous Continuous Roni Degroot MD   Stopped at 11/16/18 2214    [DISCONTINUED] piperacillin-tazobactam 2.25 g in dextrose 5 % 50 mL IVPB (ready to mix system)  2.25 g Intravenous Q6H Roni Degroot  mL/hr at 11/19/18 1412 2.25 g at 11/19/18 1412    [DISCONTINUED] potassium chloride 10% oral solution 40 mEq  40 mEq Per NG tube BID Roni Degroot MD   40 mEq at 11/19/18 0945    [DISCONTINUED] valproate (DEPACON) 430 mg in dextrose 5 % 50 mL IVPB  15 mg/kg/day Intravenous Q8H Vangie Payton MD 50 mL/hr at 11/19/18 1701 430 mg at 11/19/18 1701    [DISCONTINUED] vasopressin (PITRESSIN) 0.2 Units/mL in dextrose 5 % 100 mL infusion  0.04 Units/min Intravenous Continuous Varsha Buckner MD 12 mL/hr at 11/19/18 1410 0.04 Units/min at 11/19/18 1410     Current Outpatient Medications on File Prior to Encounter   Medication Sig Dispense Refill    aspirin (ECOTRIN) 81 MG EC tablet Take 81 mg by mouth once daily.      atorvastatin (LIPITOR) 40 MG tablet Take 40 mg by mouth once daily.      carvedilol (COREG) 6.25 MG tablet Take 6.25 mg by mouth 2 (two) times daily with meals.      clopidogrel (PLAVIX) 75 mg tablet Take 75 mg by mouth once daily.      furosemide (LASIX) 40 MG tablet Take 40 mg by mouth 2 (two) times daily.      sacubitril-valsartan (ENTRESTO) 24-26 mg per tablet Take 1 tablet by mouth 2 (two) times daily.        Allergies: Patient has no known allergies.    No family history on file.  Social History     Tobacco Use    Smoking status: Never Smoker   Substance Use Topics    Alcohol use: No     Frequency: Never    Drug use: No     Review of Systems   Unable to perform ROS: Mental status change     Objective:     Vitals:    Pulse: 85  BP: 108/63  Resp: 20  SpO2: 100 %  Oxygen Concentration (%):  36  O2 Device (Oxygen Therapy): ventilator  Vent Mode: A/C  Set Rate: 14 bmp  Vt Set: 400 mL  PEEP/CPAP: 5 cmH20  Peak Airway Pressure: 31 cmH2O  Mean Airway Pressure: 11 cmH20  Plateau Pressure: 0 cmH20    Temp  Min: 97.2 °F (36.2 °C)  Max: 98.2 °F (36.8 °C)  Pulse  Min: 80  Max: 93  BP  Min: 101/59  Max: 110/58  MAP (mmHg)  Min: 75  Max: 87  Resp  Min: 17  Max: 35  SpO2  Min: 80 %  Max: 100 %  Oxygen Concentration (%)  Min: 30  Max: 36    No intake/output data recorded.           Physical Exam   Constitutional: She appears well-developed and well-nourished.   Eyes: Pupils are equal, round, and reactive to light.   Cardiovascular: Normal rate and regular rhythm.   Pulmonary/Chest:   intubated     Neuro exam:  Unresponsive  PERRLA  Corneal reflex + bilateral  Cough reflex +  Gag absent  NO spontaneous movements noted  NO withdrawal to pain.    Skin exam: skin sloughing on abdomen. Bullous blisters on arm, nipple.     Unable to test orientation, language, memory, judgment, insight, fund of knowledge, hearing, shoulder shrug, tongue protrusion, coordination, gait due to level of consciousness.    Today I personally reviewed pertinent medications, lines/drains/airways, imaging, cardiology results, laboratory results, microbiology results, notably:    Assessment/Plan:               Other   Syncope    Day of admit:   1). NEURO  LKN: patient came into [facility]  with episodes of syncope vs seizure. She was evaluated by Neurology and Cardiology services. Post cardiac cath 11/15, she had mental status change, became unresponsive and hypotensive requiring intubation.     Neuro deficit on admit: unresponsive. PERRLA, corneals present, cough present. No spontaneous movements of extremities, does not withdraw to pain.  Imagin/9: MRI brain: There is moderate for age generalized cerebral atrophy and moderate chronic small vessel ischemic changes involving the white matter of the brain with no focal  lesion/focal finding including no seizure focus identified.  Suspected prior bilateral cataract surgery and incidental partial empty sella phenomena.  11/16 CTH: Involutional changes with microvascular  ischemic white matter disease.  No acute brain abnormality     -Ruling out CVA: ordered stat CTH on arrival to [facility] neuro icu  Antithrombotics/Anticoagulants:-TBD after CTH  Statin: - tbd after CTH  RF modification:             -CAD                  -HTN           - Seizures: working diagnosis of CPS at [facility] by Dr. Vangie Payton  Started continous video EEG monitoring. Consult epilepsy  Patient on depakote at OSH.    Sedatives             none  Patient arousable : NO  LOLIS goal 0    Rehab:  PT/OT/Speech to evaluate and treat     2) LUNGS:       Acute hypoxemic respiratory failure    Intubated. Required high FiO2 and PEEP at OSH.   Ordered CXR, will order respiratory cx  ABG follow up  CT chest with pulmonary embolus on 11/9       3) CVS:     HR 73  SBP goal <160  MAP goal>65  ECHO: most recent 11/16  · Low normal left ventricular systolic function. The estimated ejection fraction is 50-55% (technically difficult)  · Normal LV diastolic function.  · Normal right ventricular systolic function.    [facility] (11-) with 4 stents as below:  · Non-obstructed disease in the LAD  · HIgh-grade stenoses in the proximal and cxb-ks-wbbxse RCA successfully treated with PCI/two PARAS  · High-grade stenosis of the proximal PDA successfully treated with PCI/PARAS  · Hifh grade stenosis in the mid LCX successfully treated with PCI/PARAS      4) GI: npo  tube feeds to be decided  Rectal flex tube    Hepatobiliary  transaminitis - can be 2/2 shock  Increased T bili, (also anemia and abnormal haptoglobin)- ordered direct bili levels  RUQ US, amylase, lipase ordered.      5)                             GFR 22.5  Cr 2.3   CPK levels at OSH was 2699 four days ago. Ordered CK levels here and will start fluids as  indicated  Monitoring electrolytes and will correct as indicated.    6) Endocrine   TSH  0.49                         HBa1c 4.8              Above Lab values earlier this month  Will start SSI    7) Hematology  White                 Elevated 14K  H/H                    11.8/34.9        S/p blood transfusion at OSH       (Haptoglobin abnormal 80 at osh)                                    Platelets             69 low                                                     Coags                    PT/INR 14/1.4                                                           Monitor for DIC      8) /Infectious diseases  Blood cx: 11/8 and 11/16 no growth to date   urine 11/15: E coli + Klebseilla pneu. Patient was on meropenem at OSH  Started broad spectrum zosyn, vancomycin here.   Appreciate pharmacy recs on antibiotic coverage as patient has kidney dysfunction.   DVT US 11/8:Deep venous thrombosis distal right femoral vein extending into 2 and through the popliteal vein into the posterior tibial vein.  No venous reflux seen. No evidence of deep venous thrombosis or venous reflux in left lower extremity.                                                                                       Skin:  Patient with skin bullous/blisters on abdomen, sloughing - nikolsky's sign?  Wound care consult.  monitor    Lines (day):    art line being placed      samuel     8) DVT prophylaxis           Mechanical/chemical                    Stress ulcer ppx:          h2b  Pressure ulcer/Skin tears: wound care consult                                                                               The patient is being Prophylaxed for:  Venous Thromboembolism with: Mechanical/chemical  Stress Ulcer with: h2b  Ventilator Pneumonia with: -    Activity Orders          Commode at bedside starting at 11/19 2217        Full Code    Faith Del Real MD  Neurocritical Care  [facility]

## 2018-11-20 NOTE — PLAN OF CARE
Transfer for NCSE however EEG neg so far for epileptic seizures  Shock - on antibiotics, Levophed, LR @ 25/hr. Connect flotrac. On vanc and zosyn. Send cortisol. May consider hydrocortisone and florinef to achieve successful weaning off pressors  Rising creatinine - strict I/O. Close monitoring for RRT need.   Retroperitoneal hematoma - thrombocytopenia, schistocytes+, hold heparin. Check haptoglobin, fibrinogen, LDH. Possible DIC from sepsis  S/p cardiac stents for ACS. Trending cardiac enzymes. Cardiology consult.    AMS - unclear etiology. Plan for MRI today  DVT/PE history -  Heparin on hold due to retroperitoneal hematoma and thrombocytopenia. Likely will need IVC filter.

## 2018-11-20 NOTE — ASSESSMENT & PLAN NOTE
Syncope    Day of admit:   1). NEURO  LKN: patient came into [facility]  with episodes of syncope vs seizure. She was evaluated by Neurology and Cardiology services. Post cardiac cath 11/15, she had mental status change, became unresponsive and hypotensive requiring intubation.     Neuro deficit on admit: unresponsive. PERRLA, corneals present, cough present. No spontaneous movements of extremities, does not withdraw to pain.  Imagin/9: MRI brain: There is moderate for age generalized cerebral atrophy and moderate chronic small vessel ischemic changes involving the white matter of the brain with no focal lesion/focal finding including no seizure focus identified.  Suspected prior bilateral cataract surgery and incidental partial empty sella phenomena.   CTH: Involutional changes with microvascular  ischemic white matter disease.  No acute brain abnormality     -Ruling out CVA: ordered stat CTH on arrival [place] [facility] [place]  Antithrombotics/Anticoagulants:-TBD after CTH  Statin: - tbd after CTH  RF modification:             -CAD                  -HTN           - Seizures: working diagnosis of CPS at [facility] by Dr. Vangie Payton  Started continous video EEG monitoring. Consult epilepsy  Patient on depakote at OSH.    Sedatives             none  Patient arousable : NO  LOLIS goal 0    Rehab:  PT/OT/Speech to evaluate and treat     2) LUNGS:       Acute hypoxemic respiratory failure    Intubated. Required high FiO2 and PEEP at OSH.   Ordered CXR, will order respiratory cx  ABG follow up  CT chest with pulmonary embolus on            3) CVS:     HR 73  SBP goal <160  MAP goal>65  ECHO: most recent   · Low normal left ventricular systolic function. The estimated ejection fraction is 50-55% (technically difficult)  · Normal LV diastolic function.  · Normal right ventricular systolic function.    [facility] (11-) with 4 stents as below:  · Non-obstructed disease in the  LAD  · HIgh-grade stenoses in the proximal and gnr-ul-ywnqco RCA successfully treated with PCI/two PARAS  · High-grade stenosis of the proximal PDA successfully treated with PCI/PARAS  · Hifh grade stenosis in the mid LCX successfully treated with PCI/PARAS      4) GI: npo  tube feeds to be decided  Rectal flex tube    Hepatobiliary  transaminitis- can be 2/2 shock  Increased T bili (also anemia, haptoglobin abnormal)-will order direct bili levels  RUQ US, amylase, lipase ordered.      5)                             GFR 22.5  Cr 2.3   CPK levels at OSH was 2699 four days ago. Ordered CK levels here and will start fluids as indicated  Monitoring electrolytes and will correct as indicated.    6) Endocrine   TSH  0.49                         HBa1c 4.8              Above Lab values earlier this month  Will start SSI    7) Hematology  White                 Elevated 14K  H/H                    11.8/34.9        S/p blood transfusion at OSH                                           Platelets             69 low                                                     Coags                    PT/INR 14/1.4                                                           Monitor for DIC    8) /Infectious diseases  Blood cx: 11/8 and 11/16 no growth to date   urine 11/15: E coli + Klebseilla pneu. Patient was on meropenem at OSH  Started broad spectrum zosyn, vancomycin here.   Appreciate pharmacy recs on antibiotic coverage as patient has kidney dysfunction.   DVT US 11/8:Deep venous thrombosis distal right femoral vein extending into 2 and through the popliteal vein into the posterior tibial vein.  No venous reflux seen. No evidence of deep venous thrombosis or venous reflux in left lower extremity.                                                                                            Skin:  Patient with skin bullous/blisters on abdomen, sloughing - nikolsky's sign?  Wound care consult.  monitor    Lines (day):    art line being placed       samuel     8) DVT prophylaxis           Mechanical/chemical                  Stress ulcer ppx:          h2b     Pressure ulcer/Skin tears: wound care consult

## 2018-11-20 NOTE — HPI
"78 y F with mh of congestive heart failure, DM2, HTN who presents as transfer from [facility].    On 11/9, she presented to [facility] for seizure vs syncope. Evaluation in the emergency department revealed an elevated D-dimer and lactic acidosis.  Ultrasound of the lower extremities found lower extremity DVT inthe distal right femoral vein extending into and through the popliteal vein into the posterior tibial vein. Subsequent CTA revealed a nonocclusive pulmonary artery embolus. Patient admitted and anticoagulation initiated.  Evaluated by both Cardiology and Neurology.   Neurology had working diagnosis of recurrent spells suggestive of complex partial sz and generalized sz. Patient Initiated on depakote.   Cardiology ordered an echo which was normal EF.   Nuclear medicine stress test with reversible ischemia  On 11- a LHC was done. Since high grade stenosis was found in multiple coronary vessels, 4 stents were placed as below:  · Non-obstructed disease in the LAD  · HIgh-grade stenoses in the proximal and low-wd-psesfq RCA successfully treated with PCI/two PARAS  · High-grade stenosis of the proximal PDA successfully treated with PCI/PARAS  · Hifh grade stenosis in the mid LCX successfully treated with PCI/PARAS  · Estimated blood loss: <50 mL     After cardiac stenting procedure, she had episode of unresponsiveness that was witnessed by the PACU nurse with stable HR, breathing, and BP but she was "staring off into space" and unresponsive. After transfer to the telemetry unit a rapid response called and patient had worsening responsivness, She was also hypotensive.  patient was intubated for unresponsive state. She was transferred to ICU and was started on Levophed and Phenylnephrine. Her hospital course prior was complicated by hematoma in arm and anemia. Anemia worsened overnight and she was transfused with a total of 5 units PRBCs and stabilized. CT chest/head/& abdomen with findings of left " anterior renal intraperitoneal soft tissue mass most consistent with hematoma given her clinical findings. Hgb/Hct stabilized, serial Hgb/Hct ongoing.      Neurological exam documented at OSH:  11/17/2018:  Remains intubated and critically ill though more stable than yesterday. She is now responding to name and following simple commands.   11-:  Remains critically ill, intubated on Vasopressin. Not opening eyes today to verbal stimuli or moving hands. Not on and sedating agents. Hgb/Hct again decreased. CT abdomen yesterday showed decreasing hematoma. She is not on any thinners outside of Plavix. UOP is marginal at best ( 30 ml/hr). Insulin gtt weaned off with stable glucoses and close following with SSI as needed. Enteral feedings ongoing via OGT. Remains on the vent on AC mode rate 16 and breathing over rate. Plan for potassium replacement and transfusion of 2 units PRBC. No active evidence of bleeding noted. Close following ongoing.   H&H appears stabilizing.        11/19/2018Patient was more responsive over we can but is minimally responsive today.  She is minimally responsive to sternal rub.  Currently not on sedatives but does open eyes with what appears to be right upper gaze. Staff reported again what might have appeared like seizure activity this morning with right upper gaze twitching mouth. Pupils are reactive.  Patient does appear to have corneal reflexes.  She is breathing over the vent     Transfer to neuro intensive care unit where EEG services are available

## 2018-11-20 NOTE — PROGRESS NOTES
Pt transported to [facility] on continuous monitoring and portable ventilator by RN and RT. Ambu bag accompanied pt. VSS. Will continue to monitor.     1215- pt transported back to room 7079 on continuous monitoring and portable ventilator by RN and RT. Ambu bag accompanied pt. VSS. EEG called to place electrodes. Will continue to monitor.

## 2018-11-20 NOTE — PROGRESS NOTES
[facility] SHAYY Us notified of pt low UOP of 10-30/hr. No new orders received at this time. Will continue to monitor.

## 2018-11-20 NOTE — PLAN OF CARE
Problem: Patient Care Overview  Goal: Plan of Care Review  Outcome: Ongoing (interventions implemented as appropriate)  POC reviewed with pt and family at 1400. Pt unable to verbalize understanding r/t intubation. Pt's daughter Irina verbalized understanding. Questions and concerns addressed. Pt progressing toward goals. Will continue to monitor. See flowsheets for full assessment and VS info.

## 2018-11-20 NOTE — ASSESSMENT & PLAN NOTE
Patient is an 78F with elevated troponin level in setting of recent LHC and placement of PARAS x4 not currently on anticoagulation therapy, large concern from a cardiac standpoint for thrombosis of stents leading to recurrent infarction. However, patient has many other concurrent problems including mutiple organ involvement (heart, kidneys, liver) that can further exacerbate strain on the heart. Patient's thrombocytopenia can be attributed to DIC vs MAGALY vs other hemolytic process. MRI brain with multiple punctate infarcts concerning for embolic process, supporting DIC. However, patient received heparin prior to her platelet drop and should therefore be worked up for MAGALY.    Recommendations:  - Consider MAGALY workup with HIT panel  - Start dual antiplatelet therapy as soon as possible to prevent thrombosis of stents  - Repeat EKG   - Troponins trending downward with past two readings - no need to continue trending

## 2018-11-20 NOTE — ASSESSMENT & PLAN NOTE
Syncope vs seizure   At osh was being evaluated by both cardiology and neurology teams. Cardiology team did a stress test and Neurology treated for seizures with depakote  Admit to neuro icu here  q1 neurochecks  Will order stat CTH  Will order EEG monitoring continous video    Day of admit:   1). NEURO  LKN: patient had mental status change post cardiac cath procedure. Per daughter, she was not talking and went into a deep sleep post procedure which was initially thought to be anesthesia related.     Neuro deficit on admit: unresponsive. PERRLA, corneals present, cough present. No spontaneous movements of extremities, does not withdraw to pain.  Imagin/9: MRI brain: There is moderate for age generalized cerebral atrophy and moderate chronic small vessel ischemic changes involving the white matter of the brain with no focal lesion/focal finding including no seizure focus identified.  Suspected prior bilateral cataract surgery and incidental partial empty sella phenomena.   CTH: Involutional changes with microvascular  ischemic white matter disease.  No acute brain abnormality     Seizures: working diagnosis of CPS  Started continous video EEG monitoring. Consult epilepsy  Patient on depakote at OSH.    Sedatives             none  Patient arousable : NO  LOLIS goal 0    Antithrombotics/Anticoagulants:  Statin:  RF modification:             -DM2                     -HTN                    Rehab:  PT/OT/Speech to evaluate and treat     2) LUNGS:           Resp cx: No  Intubated: Y:  --PEEP    FiO2   36%                                          CXR ordered    3) CVS:     HR 73  SBP goal <160  MAP goal>65  ECHO: most recent   · Low normal left ventricular systolic function. The estimated ejection fraction is 50-55% (technically difficult)  · Normal LV diastolic function.  · Normal right ventricular systolic function.    4) GI: npo  No tube feeds  Rectal flex tube    5)                           GFR    22.5  Cr 2.3   Na 145  IVF    6) Endocrine                     TSH              0.49                         HBa1c              4.8              Lab values earlier this month    7) Hematology/Infectious diseases  White                 Elevated 14K  H/H                    11.8/34.9                                                  Platelets             69 low                                                     Coags                    PT/INR 14/1.4                                                             Blood cx: 11/8 and 11/16 no growht to date   urine 11/15: E coli + Klebseilla pneu   DVT US 11/8:  No evidence of deep venous thrombosis or venous reflux in left lower extremity.  Deep venous thrombosis distal right femoral vein extending into 2 and through the popliteal vein into the posterior tibial vein.  No venous reflux seen.                                                                                              Lines (day):    art      samuel     Antibiotics:    8) DVT prophylaxis                          Stress ulcer ppx:                   Pressure ulcer/Skin tears

## 2018-11-20 NOTE — CONSULTS
"  [facility]  Adult Nutrition  Consult Note    SUMMARY     Recommendations    Recommendation/Intervention:   As medically able, recommend starting TF.   Glucerna 1.5 @ goal rate 45mL/hr.   - Initiate @ 15mL/hr and increase by 10mL q4hrs, or as tolerated, until goal rate is reached.   - Hold for residuals >500mL.   - Provides 1620kcals, 89g protein and 820mL free water.      If BG WNL, recommend Isosource 1.5 @ goal rate 50mL/hr.   - Provides 1800kcals, 82g protein and 917mL free water.     RD to monitor.      Goals: Pt to receive nutrition by RD follow up  Nutrition Goal Status: new  Communication of RD Recs: discussed on rounds    Reason for Assessment    Reason for Assessment: consult  Diagnosis: seizures  Relevant Medical History: CHF, DM, HTN  Interdisciplinary Rounds: attended  General Information Comments: Pt intubated. Per chart review, pt stated to RD at OSH pt's current BW is 130lb. Confirmed pt does not weight 130lb, pt weighs 187lb. Pt appears nourished with multiple skin breakdowns noted. Noted pt eating po during admission and then switched to TF prior to transfer. Pt not receiving nutrition at this time.   Nutrition Discharge Planning: unable to determine at this time    Nutrition Risk Screen         Nutrition/Diet History    Factors Affecting Nutritional Intake: NPO, on mechanical ventilation    Anthropometrics    Temp: 98.7 °F (37.1 °C)  Height: 5' 5" (165.1 cm)  Height (inches): 65 in  Weight Method: Bed Scale  Weight: 85 kg (187 lb 6.3 oz)  Weight (lb): 187.39 lb  Ideal Body Weight (IBW), Female: 125 lb  % Ideal Body Weight, Female (lb): 149.91 lb  BMI (Calculated): 31.2  BMI Grade: 30 - 34.9- obesity - grade I       Lab/Procedures/Meds    Pertinent Labs Reviewed: reviewed  Pertinent Labs Comments: Na 150, BUN 43, Cr 2.2, HgbA1c 4.8, POCT GLu 154-260  Pertinent Medications Reviewed: reviewed  Pertinent Medications Comments: IVF, norepinephrine, heparin    Physical " Findings/Assessment    Overall Physical Appearance: edematous, nourished, obese, on ventilator support  Tubes: orogastric tube  Skin: other (see comments)(multiple skin breakdowns)    Estimated/Assessed Needs    Weight Used For Calorie Calculations: 85 kg (187 lb 6.3 oz)  Energy Calorie Requirements (kcal): 1547  Energy Need Method: [facility]  Protein Requirements: 85-114g(1.5-2.0g/kg)  Weight Used For Protein Calculations: 56.8 kg (125 lb 3.5 oz)  Fluid Requirements (mL): 1mL/kcal or per MD     RDA Method (mL): 1547  CHO Requirement: 50% of total kcals      Nutrition Prescription Ordered    Current Diet Order: NPO    Evaluation of Received Nutrient/Fluid Intake       % Intake of Estimated Energy Needs: 0 - 25 %  % Meal Intake: NPO    Nutrition Risk    Level of Risk/Frequency of Follow-up: (f/u 2x/week)     Assessment and Plan    Nutrition Problem  Inadequate energy intake    Related to (etiology):   NPO    Signs and Symptoms (as evidenced by):   Pt receiving <85% EEN and EPN.     Intervention:  Initiate enteral nutrition    Nutrition Diagnosis Status:   New       Monitor and Evaluation    Food and Nutrient Intake: enteral nutrition intake  Food and Nutrient Adminstration: enteral and parenteral nutrition administration  Anthropometric Measurements: weight, weight change, body mass index  Biochemical Data, Medical Tests and Procedures: electrolyte and renal panel, gastrointestinal profile, glucose/endocrine profile, inflammatory profile, lipid profile  Nutrition-Focused Physical Findings: overall appearance     Nutrition Follow-Up    RD Follow-up?: Yes

## 2018-11-20 NOTE — HPI
Consult: Elevated troponin    Ms. Dumont is an 78F with HFpEF (EF 50-55%), DM2, HTN, DVT who initially presented to [facility] on 11/9 for evaluation of syncope. During her workup for syncope, she was found to have a small non-occlusive PE in the R pulmonary artery and an abnormal nuclear stress EKG showing reversible ischemia with wall-motion abnormalities. She underwent LHC with access via the R radial artery on 11/15 with 4x PARAS placed in the RCA, LCX, and PDA. 3-4 hours after returning from the cath lab, she became unresponsive and hypotensive requiring intubation and pressor support. She was found to have a L arm and L flank hematoma, Hb of 3.9, and was transfused 5U pRBCs. She became thrombocytopenic with platelets in the 70s from 193 on admit. She is not currently on any anticoagulation due to thrombocytopenia.    She was transferred to [facility] for EEG monitoring for status epilepticus where she was found to have elevated troponin to 1.41->1.12->1.026

## 2018-11-20 NOTE — PLAN OF CARE
Problem: Patient Care Overview  Goal: Plan of Care Review  Outcome: Ongoing (interventions implemented as appropriate)  Nutrition assessment completed. Please see RD note for details.    Recommendation/Intervention:   As medically able, recommend starting TF.   Glucerna 1.5 @ goal rate 45mL/hr.   - Initiate @ 15mL/hr and increase by 10mL q4hrs, or as tolerated, until goal rate is reached.   - Hold for residuals >500mL.   - Provides 1620kcals, 89g protein and 820mL free water.      If BG WNL, recommend Isosource 1.5 @ goal rate 50mL/hr.   - Provides 1800kcals, 82g protein and 917mL free water.     RD to monitor.      Goals: Pt to receive nutrition by RD follow up  Nutrition Goal Status: new  Communication of RD Recs: discussed on rounds

## 2018-11-20 NOTE — PT/OT/SLP PROGRESS
Physical Therapy      Patient Name:  Phyllis Dumont   MRN:  [id number]    Hold PT services at this time. OT only pending extubation. Will follow-up when medically appropriate.    Dimitris Porter III, PT   11/20/2018

## 2018-11-20 NOTE — SUBJECTIVE & OBJECTIVE
Past Medical History:   Diagnosis Date    CHF (congestive heart failure)     Diabetes mellitus     Hypertension      Past Surgical History:   Procedure Laterality Date    Left heart cath Left 11/15/2018    Performed by Harmeet Carlos MD at [facility] CATH LAB    LEFT HEART CATHETERIZATION Left 11/15/2018    Procedure: Left heart cath;  Surgeon: Harmeet Carlos MD;  Location: [facility] CATH LAB;  Service: Cardiology;  Laterality: Left;    Percutaneous coronary intervention N/A 11/15/2018    Performed by Harmeet Carlos MD at [facility] CATH LAB      Current Facility-Administered Medications on File Prior to Encounter   Medication Dose Route Frequency Provider Last Rate Last Dose    [DISCONTINUED] 0.9%  NaCl infusion (for blood administration)   Intravenous Q24H PRN Nishant Hernandez MD        [DISCONTINUED] 0.9%  NaCl infusion (for blood administration)   Intravenous Q24H PRN Tiffany Gómez NP        [DISCONTINUED] 0.9%  NaCl infusion   Intravenous Continuous Roni Degroot  mL/hr at 11/19/18 1701      [DISCONTINUED] acetaminophen tablet 500 mg  500 mg Oral On Call Procedure Tiffany Gómez NP        [DISCONTINUED] acetaminophen tablet 650 mg  650 mg Oral Q6H PRN Roni Degroot MD   650 mg at 11/14/18 2125    [DISCONTINUED] chlorhexidine 0.12 % solution 15 mL  15 mL Mouth/Throat BID Roni Degroot MD   15 mL at 11/19/18 0945    [DISCONTINUED] clopidogrel tablet 75 mg  75 mg Oral Daily Tiffany Gómez NP   75 mg at 11/19/18 0946    [DISCONTINUED] cyanocobalamin injection 100 mcg  100 mcg Intramuscular Daily Tiffany Gómez NP   100 mcg at 11/19/18 0945    [DISCONTINUED] dexmedetomidine (PRECEDEX) 400mcg/100mL 0.9% NaCL infusion  0.2 mcg/kg/hr Intravenous Continuous Varsha Buckner MD   Stopped at 11/17/18 1600    [DISCONTINUED] dextrose 50% injection 12.5 g  12.5 g Intravenous PRN Tiffany Gómez NP        [DISCONTINUED] dextrose 50% injection 12.5 g  12.5 g Intravenous PRN Roni Degroot MD         [DISCONTINUED] dextrose 50% injection 25 g  25 g Intravenous PRN Tiffany Gómez NP        [DISCONTINUED] dextrose 50% injection 25 g  25 g Intravenous PRN Roni Degroot MD        [DISCONTINUED] diphenhydrAMINE 12.5 mg/5 mL elixir 25 mg  25 mg Oral On Call Procedure Tiffany Gómez NP        [DISCONTINUED] fentaNYL injection 25 mcg  25 mcg Intravenous Q30 Min PRN Nishant Hernandez MD   25 mcg at 11/16/18 2328    [DISCONTINUED] folic acid tablet 1 mg  1 mg Oral Daily Tiffany Gómez NP   1 mg at 11/19/18 0946    [DISCONTINUED] glucagon (human recombinant) injection 1 mg  1 mg Intramuscular PRN Tiffany Gómez NP        [DISCONTINUED] glucagon (human recombinant) injection 1 mg  1 mg Intramuscular PRN Roni Degroot MD        [DISCONTINUED] glucose chewable tablet 16 g  16 g Oral PRN Tiffany Gómez NP        [DISCONTINUED] glucose chewable tablet 16 g  16 g Oral PRN Roni Degroot MD        [DISCONTINUED] glucose chewable tablet 24 g  24 g Oral PRN Tiffany Gómez NP        [DISCONTINUED] glucose chewable tablet 24 g  24 g Oral PRN Roni Degroot MD        [DISCONTINUED] insulin aspart U-100 pen 0-5 Units  0-5 Units Subcutaneous QID (AC + HS) PRN Tiffany Gómez NP   1 Units at 11/18/18 2215    [DISCONTINUED] insulin aspart U-100 pen 1-10 Units  1-10 Units Subcutaneous QID (AC + HS) PRN Roni Degroot MD   6 Units at 11/19/18 1314    [DISCONTINUED] meropenem 1 g in sodium chloride 0.9 % 100 mL IVPB (ready to mix system)  1 g Intravenous Q12H Roni Degroot  mL/hr at 11/19/18 1001 1 g at 11/19/18 1001    [DISCONTINUED] naloxone injection 1 mg  1 mg Intravenous Once Roni Degroot MD   Stopped at 11/15/18 2000    [DISCONTINUED] NORepinephrine bitartrate 8 mg in dextrose 5% 250 mL infusion  0.02 mcg/kg/min Intravenous Continuous Roni Degroot MD   Stopped at 11/18/18 0015    [DISCONTINUED] ondansetron disintegrating tablet 4 mg  4 mg Oral Q6H PRN Roni WATIKNS  MD Zane   4 mg at 11/15/18 0043    [DISCONTINUED] ondansetron injection 4 mg  4 mg Intravenous Q12H PRN Harmeet Carlos MD   4 mg at 11/15/18 1125    [DISCONTINUED] pantoprazole injection 40 mg  40 mg Intravenous Daily Nishant Hernandez MD   40 mg at 11/19/18 0945    [DISCONTINUED] phenylephrine (HONG-SYNEPHRINE) 100 mg in sodium chloride 0.9% 250 mL infusion  0.5 mcg/kg/min Intravenous Continuous Roni Degroot MD   Stopped at 11/16/18 2214    [DISCONTINUED] piperacillin-tazobactam 2.25 g in dextrose 5 % 50 mL IVPB (ready to mix system)  2.25 g Intravenous Q6H Roni Degroot  mL/hr at 11/19/18 1412 2.25 g at 11/19/18 1412    [DISCONTINUED] potassium chloride 10% oral solution 40 mEq  40 mEq Per NG tube BID Roni Degroot MD   40 mEq at 11/19/18 0945    [DISCONTINUED] valproate (DEPACON) 430 mg in dextrose 5 % 50 mL IVPB  15 mg/kg/day Intravenous Q8H Vangie Payton MD 50 mL/hr at 11/19/18 1701 430 mg at 11/19/18 1701    [DISCONTINUED] vasopressin (PITRESSIN) 0.2 Units/mL in dextrose 5 % 100 mL infusion  0.04 Units/min Intravenous Continuous Varsha Buckner MD 12 mL/hr at 11/19/18 1410 0.04 Units/min at 11/19/18 1410     Current Outpatient Medications on File Prior to Encounter   Medication Sig Dispense Refill    aspirin (ECOTRIN) 81 MG EC tablet Take 81 mg by mouth once daily.      atorvastatin (LIPITOR) 40 MG tablet Take 40 mg by mouth once daily.      carvedilol (COREG) 6.25 MG tablet Take 6.25 mg by mouth 2 (two) times daily with meals.      clopidogrel (PLAVIX) 75 mg tablet Take 75 mg by mouth once daily.      furosemide (LASIX) 40 MG tablet Take 40 mg by mouth 2 (two) times daily.      sacubitril-valsartan (ENTRESTO) 24-26 mg per tablet Take 1 tablet by mouth 2 (two) times daily.        Allergies: Patient has no known allergies.    No family history on file.  Social History     Tobacco Use    Smoking status: Never Smoker   Substance Use Topics    Alcohol use: No      Frequency: Never    Drug use: No     Review of Systems   Unable to perform ROS: Mental status change     Objective:     Vitals:    Pulse: 85  BP: 108/63  Resp: 20  SpO2: 100 %  Oxygen Concentration (%): 36  O2 Device (Oxygen Therapy): ventilator  Vent Mode: A/C  Set Rate: 14 bmp  Vt Set: 400 mL  PEEP/CPAP: 5 cmH20  Peak Airway Pressure: 31 cmH2O  Mean Airway Pressure: 11 cmH20  Plateau Pressure: 0 cmH20    Temp  Min: 97.2 °F (36.2 °C)  Max: 98.2 °F (36.8 °C)  Pulse  Min: 80  Max: 93  BP  Min: 101/59  Max: 110/58  MAP (mmHg)  Min: 75  Max: 87  Resp  Min: 17  Max: 35  SpO2  Min: 80 %  Max: 100 %  Oxygen Concentration (%)  Min: 30  Max: 36    No intake/output data recorded.           Physical Exam   Constitutional: She appears well-developed and well-nourished.   Eyes: Pupils are equal, round, and reactive to light.   Cardiovascular: Normal rate and regular rhythm.   Pulmonary/Chest:   intubated     Unable to test orientation, language, memory, judgment, insight, fund of knowledge, hearing, shoulder shrug, tongue protrusion, coordination, gait due to level of consciousness.    Today I personally reviewed pertinent medications, lines/drains/airways, imaging, cardiology results, laboratory results, microbiology results, notably:

## 2018-11-20 NOTE — PT/OT/SLP PROGRESS
Occupational Therapy  Discontinued    Patient Name:  Phyllis Dumont   MRN:  [id number]    Patient not seen today secondary to patient not appropriate: currently intubated, on EEG, not following commands. Demo no active/purposful movt. Discussed with RN on this date. Pt with active nursing orders for passive range of motion.     OT orders d/c at this time. Please re consult when appropriate for skilled OT services.     ELIZ Soares  11/20/2018

## 2018-11-20 NOTE — PROCEDURES
"Phyllis Dumont is a 78 y.o. female patient.    Temp: 98.1 °F (36.7 °C) (18)  Pulse: 97 (18)  Resp: (!) 24 (18)  BP: (!) 107/56 (18)  SpO2: 100 % (18)  Weight: 85 kg (187 lb 6.3 oz) (18)  Height: 5' 5" (165.1 cm) (18)       Arterial Line  Date/Time: 2018 2:09 AM  Location procedure was performed: [facility] NEURO CRITICAL [facility]  Performed by: Simone Cedeño MD  Authorized by: Simone Cedeño MD   Pre-op Diagnosis: hypotension  Post-operative diagnosis: hypotension  Consent Done: Yes  Consent: Verbal consent obtained. Written consent obtained.  Risks and benefits: risks, benefits and alternatives were discussed  Consent given by: power of   Procedure consent: procedure consent matches procedure scheduled  Required items: required blood products, implants, devices, and special equipment available  Patient identity confirmed: , MRN and name  Time out: Immediately prior to procedure a "time out" was called to verify the correct patient, procedure, equipment, support staff and site/side marked as required.  Preparation: Patient was prepped and draped in the usual sterile fashion.  Indications: multiple ABGs, respiratory failure and hemodynamic monitoring  Location: left radial  Needle gauge: 20  Number of attempts: 1  Complications: No  Post-procedure: dressing applied  Post-procedure CMS: normal  Patient tolerance: Patient tolerated the procedure well with no immediate complications          Simone Cedeño  2018  "

## 2018-11-20 NOTE — PROGRESS NOTES
Patient arrived to [facility] from [facility] by [facility]    Type of stroke/diagnosis: Seizures    Current symptoms: no response to painful stimuli; + cough, + gag, + corneals    Skin assessment done: Yes    Wounds noted: Skin tear to LLQ abdomen, right breast, and right abdominal/pelvic fold; blisters to left arm    NCC notified: MD Gala

## 2018-11-20 NOTE — CONSULTS
Inpatient consult to Physical Medicine Rehab  Consult performed by: YAZAN Olvera  Consult ordered by: Faith Del Real MD  Reason for consult: assess rehab needs      Consult received.  Reviewed patient history and current admission.    Patient is too acute at this time; rehab potential cannot be appropriately assessed.  Recommend early mobility, OOB in chair, and PT/OT/SLP when appropriate.  Will follow for additional rehab needs and post-acute recommendation when patient is medically stable and able to participate with therapy.    Thank you for consult.    HILARY Banks, WENDIP-C  Physical Medicine & Rehabilitation   11/20/2018  Spectralink: [id number]

## 2018-11-20 NOTE — PROGRESS NOTES
Patient arrived intubated with a 7.0 ETT at the 23 cm hector at the lip with a bite block. Patient placed on ventilator on documented settings.

## 2018-11-20 NOTE — SUBJECTIVE & OBJECTIVE
Past Medical History:   Diagnosis Date    CHF (congestive heart failure)     Diabetes mellitus     Hypertension        Past Surgical History:   Procedure Laterality Date    Left heart cath Left 11/15/2018    Performed by Harmeet Carlos MD at [facility] CATH LAB    LEFT HEART CATHETERIZATION Left 11/15/2018    Procedure: Left heart cath;  Surgeon: Harmeet Carlos MD;  Location: [facility] CATH LAB;  Service: Cardiology;  Laterality: Left;    Percutaneous coronary intervention N/A 11/15/2018    Performed by Harmeet Carlos MD at [facility] CATH LAB       Review of patient's allergies indicates:  No Known Allergies    Current Facility-Administered Medications on File Prior to Encounter   Medication    [DISCONTINUED] 0.9%  NaCl infusion    [DISCONTINUED] acetaminophen tablet 500 mg    [DISCONTINUED] acetaminophen tablet 650 mg    [DISCONTINUED] chlorhexidine 0.12 % solution 15 mL    [DISCONTINUED] clopidogrel tablet 75 mg    [DISCONTINUED] cyanocobalamin injection 100 mcg    [DISCONTINUED] dextrose 50% injection 12.5 g    [DISCONTINUED] dextrose 50% injection 25 g    [DISCONTINUED] diphenhydrAMINE 12.5 mg/5 mL elixir 25 mg    [DISCONTINUED] folic acid tablet 1 mg    [DISCONTINUED] glucagon (human recombinant) injection 1 mg    [DISCONTINUED] glucose chewable tablet 16 g    [DISCONTINUED] glucose chewable tablet 24 g    [DISCONTINUED] insulin aspart U-100 pen 1-10 Units    [DISCONTINUED] naloxone injection 1 mg    [DISCONTINUED] ondansetron disintegrating tablet 4 mg    [DISCONTINUED] ondansetron injection 4 mg    [DISCONTINUED] pantoprazole injection 40 mg    [DISCONTINUED] piperacillin-tazobactam 2.25 g in dextrose 5 % 50 mL IVPB (ready to mix system)    [DISCONTINUED] potassium chloride 10% oral solution 40 mEq    [DISCONTINUED] valproate (DEPACON) 430 mg in dextrose 5 % 50 mL IVPB    [DISCONTINUED] vasopressin (PITRESSIN) 0.2 Units/mL in dextrose 5 % 100 mL infusion     Current Outpatient Medications on File  Prior to Encounter   Medication Sig    metFORMIN (GLUCOPHAGE) 1000 MG tablet Take 1,000 mg by mouth 2 (two) times daily with meals.    aspirin (ECOTRIN) 81 MG EC tablet Take 81 mg by mouth once daily.    atorvastatin (LIPITOR) 40 MG tablet Take 40 mg by mouth once daily.    carvedilol (COREG) 6.25 MG tablet Take 6.25 mg by mouth 2 (two) times daily with meals.    clopidogrel (PLAVIX) 75 mg tablet Take 75 mg by mouth once daily.    furosemide (LASIX) 40 MG tablet Take 40 mg by mouth 2 (two) times daily.    sacubitril-valsartan (ENTRESTO) 24-26 mg per tablet Take 1 tablet by mouth 2 (two) times daily.     Family History     None        Tobacco Use    Smoking status: Never Smoker   Substance and Sexual Activity    Alcohol use: No     Frequency: Never    Drug use: No    Sexual activity: Not Currently     Review of Systems   Unable to perform ROS: intubated     Objective:     Vital Signs (Most Recent):  Temp: 98.7 °F (37.1 °C) (11/20/18 1105)  Pulse: 99 (11/20/18 1300)  Resp: (!) 21 (11/20/18 1300)  BP: (!) 101/55 (11/20/18 1300)  SpO2: 100 % (11/20/18 1300) Vital Signs (24h Range):  Temp:  [98 °F (36.7 °C)-98.7 °F (37.1 °C)] 98.7 °F (37.1 °C)  Pulse:  [] 99  Resp:  [0-29] 21  SpO2:  [83 %-100 %] 100 %  BP: ()/(53-68) 101/55  Arterial Line BP: (100-124)/(50-72) 111/60     Weight: 85 kg (187 lb 6.3 oz)  Body mass index is 31.18 kg/m².    SpO2: 100 %  O2 Device (Oxygen Therapy): ventilator      Intake/Output Summary (Last 24 hours) at 11/20/2018 1400  Last data filed at 11/20/2018 1300  Gross per 24 hour   Intake 2153.91 ml   Output 510 ml   Net 1643.91 ml       Lines/Drains/Airways     Central Venous Catheter Line                 Percutaneous Central Line Insertion/Assessment - triple lumen  11/15/18 1915 right femoral vein;right femoral 4 days          Drain                 NG/OG Tube 11/15/18 2000 18 Fr. Center mouth 4 days         Rectal Tube 11/19/18 0758 rectal tube w/ balloon (indicate number  of mLs) 1 day         Urethral Catheter 18 1245 less than 1 day          Airway                 Airway - Non-Surgical 18 Endotracheal Tube 4 days          Arterial Line                 Arterial Line 18 0100 Left Radial less than 1 day                Physical Exam   Eyes: Conjunctivae are normal. No scleral icterus.   Neck: No thyromegaly present.   Cardiovascular: Normal rate, regular rhythm and normal heart sounds.   No murmur heard.  Pulmonary/Chest:   Patient intubated   Abdominal: Soft. Bowel sounds are normal. She exhibits no distension. There is no tenderness.   Musculoskeletal: She exhibits edema (Bilat UE and LE (3+ pitting up to knees)). She exhibits no tenderness.   Lymphadenopathy:     She has no cervical adenopathy.   Skin: She is diaphoretic.   Nursing note and vitals reviewed.      Significant Labs:   Blood Culture: No results for input(s): LABBLOO in the last 48 hours., CMP   Recent Labs   Lab 18  2344 18  0531 18  0201    145 150*   K 4.4 3.9 4.4   * 117* 124*   CO2 16* 17* 14*   * 219* 93   BUN 41* 41* 43*   CREATININE 2.2* 2.3* 2.2*   CALCIUM 6.8* 6.9* 7.9*   PROT 4.2* 4.3* 4.3*   ALBUMIN 2.1* 1.9* 1.5*   BILITOT 1.6* 1.4* 1.5*   ALKPHOS 51 52 70   * 159* 138*   * 131* 98*   ANIONGAP 11 11 12   ESTGFRAFRICA 23.7* 22.5* 23.7*   EGFRNONAA 20.6* 19.5* 20.6*   , CBC   Recent Labs   Lab 18  0531  18  1526 18  0201 18  1119   WBC 14.00*  --   --  10.54 11.13   HGB 11.8*  11.8*   < > 12.0 12.3 12.8   HCT 34.9*  34.9*   < > 35.9* 35.2* 37.5   PLT 69*  --   --  61* 67*    < > = values in this interval not displayed.   , INR   Recent Labs   Lab 18  0201   INR 1.2    and Troponin   Recent Labs   Lab 18  2344 18  0201   TROPONINI 1.12* 1.026*       Significant Imagin2018 EKG:  Normal sinus rhythm  Low voltage QRS  Cannot rule out Inferior infarct ,age undetermined  ST and T wave  abnormality, consider anterolateral ischemia    11/16/2018 TTE:  · Low normal left ventricular systolic function. The estimated ejection fraction is 50-55% (technically difficult)  · Normal LV diastolic function.  · Normal right ventricular systolic function.

## 2018-11-20 NOTE — HOSPITAL COURSE
11/19: transfer to [facility] [place], [place], admit to neuro ICU. Stat CTH, EEG monitoring  11/20: hypotension, EEG for concern of seizures. MRI brain with multifocal, multiple vascular territory ischemia. Multiorgan dysfunction on admission.   11/21: loss of right radial arterial pulse. Repeat echocardiogram - result pending. EEG neg for seizures.

## 2018-11-20 NOTE — PT/OT/SLP PROGRESS
Speech Language Pathology  Discharge    Phyllis Dumont  MRN: [id number]    Patient not seen today secondary to pt intubated at this time. Please re-consult when pt medically appropriate.     Beverley Banegas CCC-SLP   11/20/2018

## 2018-11-20 NOTE — PT/OT/SLP PROGRESS
Physical Therapy      Patient Name:  Phyllis Dumont   MRN:  [id number]     Patient not seen today secondary to patient not appropriate: currently intubated, on EEG, not following commands. Demo no active/purposful movt. Discussed with RN on this date. Pt with active nursing orders for passive range of motion.      PT orders d/c at this time. Please re consult when appropriate for skilled PT services.      Flor Hanson, PT   11/20/2018

## 2018-11-20 NOTE — PLAN OF CARE
11/20/18 1518   Discharge Assessment   Assessment Type Discharge Planning Assessment   Confirmed/corrected address and phone number on facesheet? Yes   Assessment information obtained from? Caregiver   Expected Length of Stay (days) 7   Communicated expected length of stay with patient/caregiver yes   Prior to hospitilization cognitive status: Alert/Oriented   Prior to hospitalization functional status: Assistive Equipment;Needs Assistance   Current cognitive status: Coma/Sedated/Intubated   Current Functional Status: Completely Dependent   Facility Arrived From: [facility]   Lives With child(renee), adult   Able to Return to Prior Arrangements yes   Is patient able to care for self after discharge? Yes   Who are your caregiver(s) and their phone number(s)? Alma Delia Dumont (dtr)  [phone]   Patient's perception of discharge disposition other (comments)  (kathrine)   Readmission Within The Last 30 Days no previous admission in last 30 days   Patient currently being followed by outpatient case management? No   Patient currently receives any other outside agency services? Yes   Name and contact number of agency or person providing outside services [facility], nurse visit weekly   Is it the patient/care giver preference to resume care with the current outside agency? Yes   Equipment Currently Used at Home bedside commode;bath bench;walker, rolling;wheelchair   Do you have any problems affording any of your prescribed medications? No   Is the patient taking medications as prescribed? yes   Does the patient have transportation home? Yes   Transportation Available family or friend will provide   Does the patient receive services at the [facility]? No   Discharge Plan A Long-term acute care facility (LTAC)   Discharge Plan B Skilled Nursing Facility   Patient/Family In Agreement With Plan unable to assess                  PCP:  Tiffany Gómez NP        Pharmacy:    [address] NICKI BRUNO  [place]  ARCHJUANNATHALIE SANTOS [facility]  [address]  [place] [address]  Phone: [phone] Fax: [phone]        Emergency Contacts:  Extended Emergency Contact Information  Primary Emergency Contact: Alma Delia Dumont   [place]  Mobile Phone: [phone]  Relation: Daughter        Insurance:  Payor: MEDICARE / Plan: MEDICARE PART A & B / Product Type: [facility] /       Marylin Perez RN, CCRN-K, [facility]  Neuro-Critical Care   X [phone]

## 2018-11-21 NOTE — CONSULTS
[facility]  Vascular Neurology  Comprehensive Stroke Center  Consult Note    Inpatient consult to Vascular (Stroke) Neurology  Consult performed by: Kiersten Bear MD  Consult ordered by: Magen Jackson NP        Assessment/Plan:     Patient is a 78 y.o. year old female with:    * Arterial ischemic stroke, multifocal, multiple vascular territories, acute    Patient is an 79 yo female w/ past medical history as mentioned above and a complicated medical course at the OSH which included PCI w/ 4 stent placements on 10/15, retroperitoneal hematoma w/ significant Hgb drop requiring transfusion of multiple units of PRBCs, episode of non-responsiveness w/ hypotension requiring intubation and pressor support who was initially transferred to [facility] for higher level of care, vEEG as there was concern for NCSE. EEG has not been concerning for NCSE at this time, so further imagining modalities were pursued, which resulted in a discovery of multiple acute punctate infarcts in b/l hemispheres as well as R cerebellum. Based on the location of the infarcts two etiologies are suspected, hypoperfusion resulting in watershed infarcts as well as cardioembolic origin of ischemia.    Would benefit from additional cardiac imaging to assess for any thrombi, as patient w/ akinetic portion of the inferior wall on ECHO on 11/09 but no mention of akinesis or any other WMA on repeat ECHO after stent placement on 11/16. Recommend permissive HTN for acute ischemic infarcts.   Patient currently off antioplatelets and anticoagulation in the setting of recent retroperitoneal bleed requiring transfusions.   Patient's AMS is also likely multifactorial in the setting of possible infection, hypernatreamia and other metabolic derangements.   Would recommend completing fulll stroke w/u, especially extracranial and intracranial vessel imaging once patient is more stable from medical perspective.     Antithrombotics for secondary stroke  prevention: Antiplatelets: None: Hemorrhage any type  Anticoagulants: Heparin protocol without bolus  No Afib thus far, no evidence of throbus, although further evaluation is warranted as 11/08 ECHO w/ segmental WMA    Statins for secondary stroke prevention and hyperlipidemia, if present:   Statins: Atorvastatin 40 if LDL >70    Aggressive risk factor modification: HLD, Diet, Exercise, Obesity, CAD     Rehab efforts: PT/OT/SLP to evaluate and treat when appropriate     Diagnostics ordered/pending: TTE to assess cardiac function/status     VTE prophylaxis: None: Reason for No Pharmacological VTE Prophylaxis: Mechanical prophylaxis: Place SCDs    BP parameters: Infarct: No intervention, SBP <220         Alteration in skin integrity    - Multiple weeping lesions noted primarily on LUE  - Per primary ream       Acute hypoxemic respiratory failure    - Intubated  - Per primary team      JAIME (acute kidney injury)    - sCr 2.2   - Management per primary tea,      Acute pulmonary embolism non occlusive right main pulmonary artery    - Non-occlusive PE noted at the OSH  - No anticoagulation at this time as patient w/ large retroperitoneal hematoma requiring transfusions at OSH      Lactic acidosis    - LA 2.8   - Unclear if 2/2 infection vs hypoxia   - WBC WNL  - Currently on Empiric Abx treatment  - Management per primary team         STROKE DOCUMENTATION          NIH Scale:  1a. Level Of Consciousness: 3-->Responds only with reflex motor or autonomic effects or totally unresponsive, flaccid, and areflexic  1b. LOC Questions: 2-->Answers neither question correctly  1c. LOC Commands: 2-->Performs neither task correctly  2. Best Gaze: 0-->Normal  3. Visual: 0-->No visual loss  4. Facial Palsy: 0-->Normal symmetrical movements  5a. Motor Arm, Left: 4-->No movement  5b. Motor Arm, Right: 4-->No movement  6a. Motor Leg, Left: 4-->No movement  6b. Motor Leg, Right: 3-->No effort against gravity: leg falls to bed immediately  7.  "Limb Ataxia: 0-->Absent  8. Sensory: 0-->Normal: no sensory loss  9. Best Language: 0-->No aphasia: normal  10. Dysarthria: (UN) Intubated or other physical barrier  11. Extinction and Inattention (formerly Neglect): 0-->No abnormality  Total (NIH Stroke Scale): 22    Modified [place]    [place] Coma Scale:5   ABCD2 Score:    [id number] Score:   HAS -BLED Score:   ICH Score:   Hunt & Thomason Classification:       Thrombolysis Candidate? No, Out of window , Increased risk of bleedign d ue to co-morbid conditions       Interventional Revascularization Candidate?   Is the patient eligible for mechanical endovascular reperfusion (MARY ALICE)?  No; No large vessel occlusion and multiple territories affected, punctate infarcts likley 2/2 hypoperfusion and as well as a cardioembolic source      Hemorrhagic change of an Ischemic Stroke: Does this patient have an ischemic stroke with hemorrhagic changes? No     Subjective:     History of Present Illness:  Patient  is an 79 yo female w/ past medical history significant for HFpEF (EF 50-55%), DM2, HTN, Hx of recent DVT who initially presented to [facility] on 11/9 for evaluation of syncope. OSH w/u revealed a small non-occlusive PE in the R pulmonary a., as well as an abnormal nuclear stress EKG showing reversible ischemia w/ WMA. She underwent LHC with access via the R radial artery on 11/15 with 4 x PARAS placed in the RCA, LCX, and PDA. Several hours after the procedure patient was found to be unresponsive, requiring intubation and hypotensive requiring pressors. Prior to the intubation, per record review, patient had episode of AMS that was witnessed by the PACU nurse with stable HR, breathing, and BP but she was "staring off into space" w/o response to external stimuli.   During OSH hospitalization patient developed L arm and L flank hematoma, w/  Hgb dropping down to 3.9,requiring 5U PRBCs; also noted to become thrombocytopenic with platelets in the 70s from 193 on " admit.   [place] [facility] [place] [facility] for EEG monitoring for suspected status epilepticus after above mentioned incidents.   EEG has been unrevealing thus far, prompting further brain imaging studies to be ordered - MRI brain w/ multiple scattered punctate areas of acute infarction involving both hemispheres and the right cerebellum.   Vascular Neurology consulted for evaluation of acute infarctions noted on the MRI.  Patient is intubated, no family at bedside to provide additional information. Hx obtained from previous records and primary team.           Past Medical History:   Diagnosis Date    CHF (congestive heart failure)     Diabetes mellitus     Hypertension      Past Surgical History:   Procedure Laterality Date    Left heart cath Left 11/15/2018    Performed by Harmeet Carlos MD at [facility] CATH LAB    LEFT HEART CATHETERIZATION Left 11/15/2018    Procedure: Left heart cath;  Surgeon: Harmeet Carlos MD;  Location: [facility] CATH LAB;  Service: Cardiology;  Laterality: Left;    Percutaneous coronary intervention N/A 11/15/2018    Performed by Harmeet Carlos MD at [facility] CATH LAB     History reviewed. No pertinent family history.  Social History     Tobacco Use    Smoking status: Never Smoker   Substance Use Topics    Alcohol use: No     Frequency: Never    Drug use: No     Review of patient's allergies indicates:  No Known Allergies    Medications: I have reviewed the current medication administration record.    Medications Prior to Admission   Medication Sig Dispense Refill Last Dose    metFORMIN (GLUCOPHAGE) 1000 MG tablet Take 1,000 mg by mouth 2 (two) times daily with meals.       aspirin (ECOTRIN) 81 MG EC tablet Take 81 mg by mouth once daily.       atorvastatin (LIPITOR) 40 MG tablet Take 40 mg by mouth once daily.       carvedilol (COREG) 6.25 MG tablet Take 6.25 mg by mouth 2 (two) times daily with meals.       clopidogrel (PLAVIX) 75 mg tablet Take 75 mg by mouth once daily.        furosemide (LASIX) 40 MG tablet Take 40 mg by mouth 2 (two) times daily.       sacubitril-valsartan (ENTRESTO) 24-26 mg per tablet Take 1 tablet by mouth 2 (two) times daily.          Review of Systems   Unable to perform ROS: Intubated   Constitutional: Positive for activity change.   HENT: Negative.    Cardiovascular: Positive for leg swelling.   Gastrointestinal: Negative for blood in stool.   Endocrine: Negative.    Genitourinary: Positive for decreased urine volume.   Skin: Positive for wound.     Objective:     Vital Signs (Most Recent):  Temp: 98.1 °F (36.7 °C) (11/20/18 1902)  Pulse: 100 (11/20/18 2003)  Resp: 20 (11/20/18 2003)  BP: (!) 143/70 (11/20/18 2002)  SpO2: 100 % (11/20/18 2003)    Vital Signs Range (Last 24H):  Temp:  [98.1 °F (36.7 °C)-98.7 °F (37.1 °C)]   Pulse:  []   Resp:  [0-29]   BP: ()/(53-70)   SpO2:  [100 %]   Arterial Line BP: (100-145)/(50-72)     Physical Exam   Constitutional: She appears well-developed.   Intubated, non-responsive female w/ anasarca noted as well as skin tears/weaping wounds particularly on LUE   HENT:   Head: Normocephalic and atraumatic.   On EEG monitoring    Eyes:   B/L constricted pupils 2-3 mm, minimally responsive to light sluggish    Neck:   intubated   Cardiovascular: Regular rhythm.   Tachycardic    Pulmonary/Chest:   Intubated on vent    Abdominal: Soft.   Anasarca present   Musculoskeletal: She exhibits edema.   Skin:   Extremities cool to touch        Neurological Exam:   LOC: comatose  Attention Span: poor  Language: No aphasia, Intubated  Articulation: Untestable due to intubation  Orientation: Person, Place, Time , Untestable due to intubation  Visual Fields: Full  Untestable   EOM (CN III, IV, VI): Oculocephalic Reflex  Normal  Pupils (CN II, III): PERRL  Facial Sensation (CN V): Corneal reflex present Bilateral  Facial Movement (CN VII): Unable to test   Gag Reflex: absent  Reflexes: preserved  Motor: Arm left  Plegia 0/5  Leg left   Plegia 0/5  Arm right  Plegia 0/5  Leg right Paresis: 1/5  Cebellar: No evidence of appendicular or axial ataxia  Sensation: Untestable   Tone: Normal tone throughout      Laboratory:  CMP:   Recent Labs   Lab 11/20/18  0201   CALCIUM 7.9*   ALBUMIN 1.5*   PROT 4.3*   *   K 4.4   CO2 14*   *   BUN 43*   CREATININE 2.2*   ALKPHOS 70   ALT 98*   *   BILITOT 1.5*     CBC:   Recent Labs   Lab 11/20/18  1119   WBC 11.13   RBC 4.31   HGB 12.8   HCT 37.5   PLT 67*   MCV 87   MCH 29.7   MCHC 34.1     Lipid Panel: No results for input(s): CHOL, LDLCALC, HDL, TRIG in the last 168 hours.  Coagulation:   Recent Labs   Lab 11/20/18  0201   INR 1.2   APTT 35.2*     Hgb A1C: No results for input(s): HGBA1C in the last 168 hours.  TSH: No results for input(s): TSH in the last 168 hours.    Diagnostic Results:      Brain imaging:  MRI w/o (11/20)    Scattered punctate areas of acute infarction involving both hemispheres and the right cerebellum.  Vessel Imaging:  N/A    Cardiac Evaluation:   TTE (11/16)  · Low normal left ventricular systolic function. The estimated ejection fraction is 50-55% (technically difficult)  · Normal LV diastolic function.  · Normal right ventricular systolic function    TTE (11/09)  · Left ventricle shows concentric remodeling.  · Left ventricle ejection fraction is at 55%  · Local segmental wall motion abnormalities (base of the inferior wall is akinetic)  · Grade I (mild) left ventricular diastolic dysfunction consistent with impaired relaxation.  · RV systolic function is normal.  · Mild aortic regurgitation.  · There is mild leaflet calcification of the Mitral Valve.  · Normal central venous pressure (3 mm Hg).        Kiersten Bear MD  Comprehensive Stroke Center  Department of Vascular Neurology   [facility]

## 2018-11-21 NOTE — ASSESSMENT & PLAN NOTE
- LA 2.8   - Unclear if 2/2 infection vs hypoxia   - WBC WNL  - Currently on Empiric Abx treatment  - Management per primary team

## 2018-11-21 NOTE — PLAN OF CARE
Problem: Patient Care Overview  Goal: Plan of Care Review  Outcome: Ongoing (interventions implemented as appropriate)  POC reviewed with pt at 0500. Pt unresponsive/intubated and unable to verbalize understanding. No acute events overnight. Pt with numerous skin tears s/t blisters rupturing. Per wound care, skin tears cleaned with sterile saline and re bandaged.Tube feeds infusing at 30cc/hr, goal rate of 45cc/hr. Levo infusing at 0.09. MAP >65 maintained. Pt remained afebrile overnight. Pt progressing toward goals. Will continue to monitor. See flowsheets for full assessment and VS info.

## 2018-11-21 NOTE — NURSING
Called to bedside to administer Echo contrast.  Pt intubated.  Patient identified by 2 identifiers on armband, allergies reviewed in EPIC.  Contacted patients daughter Alma Delia- procedure explained, telephone consent obtained & allergies reviewed. Denies previous reactions to blood transfusions.  Rt groin femoral TLC IV in place, flushed w/ 10cc NS pre & post contrast administration.  3cc Optison administered, echo images obtained.  Pt tolerated procedure well.

## 2018-11-21 NOTE — NURSING
Upon assessment of pt, RN noted pt's right radial pulse could not be palpated. RN also unable to doppler right radial pulse. Pt's cap refill < 3 seconds. Pt's RUE exteremity cold and fingertips are dusky. SHAYY Cm w/ [facility] notified. Orders for arterial US placed.     Esophageal probe placed. Pt core temp 97.1. Bear hugger applied. Hot compress also applied to pt's R arm and hand.

## 2018-11-21 NOTE — PLAN OF CARE
11/21/2018  11:59 AM    Have discussed the case with primary team. Her drop in EF does not appear to be consistent with a new ischemic etiology, and may be the result of her complex inflammatory systemic issues.    Given recent re-vascularization and current lack of evidence of in-stent thrombosis, there is no role for immediate angiographic re-evaluation.    To prevent thrombosis would suggest starting DAPT or mono-antiplatelet therapy in combination with full anti-coagulant (data suggest that plavix and OAC has more benefit than ASA and OAC).     However, given her spontaneous bleed, the risk of re-bleeding is significant. Overall her mortality risk is high and prognosis poor.    If she stabilizes to the point of being ambulatory, would suggest repeating an echocardiogram to re-examine her ventricular function and make a decision on the need for additional angiography at that time.    Helio Ohara

## 2018-11-21 NOTE — SUBJECTIVE & OBJECTIVE
Neurologic Chief Complaint: ***    Subjective:     Interval History: Patient is seen for follow-up neurological assessment and treatment recommendations: ***    HPI, Past Medical, Family, and Social History remains the same as documented in the initial encounter.     Review of Systems  Scheduled Meds:   chlorhexidine  15 mL Mouth/Throat BID    famotidine  20 mg Per NG tube Daily    piperacillin-tazobactam 4.5 g in dextrose 5 % 100 mL IVPB (ready to mix system)  4.5 g Intravenous Q12H     Continuous Infusions:   lactated ringers 25 mL/hr at 11/20/18 2002    norepinephrine bitartrate-D5W 0.09 mcg/kg/min (11/20/18 2002)     PRN Meds:dextrose 50%, glucagon (human recombinant), insulin aspart U-100    Objective:     Vital Signs (Most Recent):  Temp: 98.1 °F (36.7 °C) (11/20/18 1902)  Pulse: 100 (11/20/18 2003)  Resp: 20 (11/20/18 2003)  BP: (!) 143/70 (11/20/18 2002)  SpO2: 100 % (11/20/18 2003)  BP Location: Right arm    Vital Signs Range (Last 24H):  Temp:  [98.1 °F (36.7 °C)-98.7 °F (37.1 °C)]   Pulse:  []   Resp:  [0-29]   BP: ()/(53-70)   SpO2:  [100 %]   Arterial Line BP: (100-145)/(50-72)   BP Location: Right arm    Physical Exam    Neurological Exam:   {Neuro Exam:42596}    Laboratory:  {LABS:49039}    Diagnostic Results     Brain Imaging   ***  Vessel Imaging   ***  Cardiac Imaging   ***

## 2018-11-21 NOTE — PROGRESS NOTES
"[facility]  Neurocritical Care  Progress Note    Admit Date: 11/19/2018  Service Date: 11/21/2018  Length of Stay: 2    Subjective:     Chief Complaint: Arterial ischemic stroke, multifocal, multiple vascular territories, acute    History of Present Illness: 78 y F with mh of congestive heart failure, DM2, HTN who presents as transfer from [facility].    On 11/9, she presented to [facility] for seizure vs syncope. Evaluation in the emergency department revealed an elevated D-dimer and lactic acidosis.  Ultrasound of the lower extremities found lower extremity DVT inthe distal right femoral vein extending into and through the popliteal vein into the posterior tibial vein. Subsequent CTA revealed a nonocclusive pulmonary artery embolus. Patient admitted and anticoagulation initiated.  Evaluated by both Cardiology and Neurology.   Neurology had working diagnosis of recurrent spells suggestive of complex partial sz and generalized sz. Patient Initiated on depakote.   Cardiology ordered an echo which was normal EF.   Nuclear medicine stress test with reversible ischemia  On 11- a LHC was done. Since high grade stenosis was found in multiple coronary vessels, 4 stents were placed as below:  · Non-obstructed disease in the LAD  · HIgh-grade stenoses in the proximal and dci-el-joczuv RCA successfully treated with PCI/two PARAS  · High-grade stenosis of the proximal PDA successfully treated with PCI/PARAS  · Hifh grade stenosis in the mid LCX successfully treated with PCI/PARAS  · Estimated blood loss: <50 mL     After cardiac stenting procedure, she had episode of unresponsiveness that was witnessed by the PACU nurse with stable HR, breathing, and BP but she was "staring off into space" and unresponsive. After transfer to the telemetry unit a rapid response called and patient had worsening responsivness, She was also hypotensive.  patient was intubated for unresponsive state. She was " transferred to ICU and was started on Levophed and Phenylnephrine. Her hospital course prior was complicated by hematoma in arm and anemia. Anemia worsened overnight and she was transfused with a total of 5 units PRBCs and stabilized. CT chest/head/& abdomen with findings of left anterior renal intraperitoneal soft tissue mass most consistent with hematoma given her clinical findings. Hgb/Hct stabilized, serial Hgb/Hct ongoing.      Neurological exam documented at OSH:  11/17/2018:  Remains intubated and critically ill though more stable than yesterday. She is now responding to name and following simple commands.   11-:  Remains critically ill, intubated on Vasopressin. Not opening eyes today to verbal stimuli or moving hands. Not on and sedating agents. Hgb/Hct again decreased. CT abdomen yesterday showed decreasing hematoma. She is not on any thinners outside of Plavix. UOP is marginal at best ( 30 ml/hr). Insulin gtt weaned off with stable glucoses and close following with SSI as needed. Enteral feedings ongoing via OGT. Remains on the vent on AC mode rate 16 and breathing over rate. Plan for potassium replacement and transfusion of 2 units PRBC. No active evidence of bleeding noted. Close following ongoing.   H&H appears stabilizing.        11/19/2018Patient was more responsive over we can but is minimally responsive today.  She is minimally responsive to sternal rub.  Currently not on sedatives but does open eyes with what appears to be right upper gaze. Staff reported again what might have appeared like seizure activity this morning with right upper gaze twitching mouth. Pupils are reactive.  Patient does appear to have corneal reflexes.  She is breathing over the vent     Transfer to neuro intensive care unit where EEG services are available    Hospital Course: 11/19: transfer to [facility], admit to neuro ICU. Stat CTH, EEG monitoring  11/20: hypotension, EEG for concern of seizures. MRI brain with  multifocal, multiple vascular territory ischemia. Multiorgan dysfunction on admission.   11/21: loss of right radial arterial pulse. Repeat echocardiogram - result pending. EEG neg for seizures.    Review of Symptoms: intubated, encephalopathic cannot participate.  Constitutional: Denies fevers, weight loss, chills, or weakness.   Eyes: Denies changes in vision.   ENT: Denies dysphagia, nasal discharge, ear pain or discharge.   Cardiovascular: Denies chest pain, palpitations, orthopnea, or claudication.   Respiratory: Denies shortness of breath, cough, hemoptysis, or wheezing.   GI: Denies nausea/vomitting, hematochezia, melena, abd pain, or changes in appetite.   : Denies dysuria, incontinence, or hematuria.   Musculoskeletal: Denies joint pain or myalgias.   Skin/breast: Denies rashes, lumps, lesions, or discharge.   Neurologic: Denies headache, dizziness, vertigo, or paresthesias.   Psychiatric: Denies changes in mood or hallucinations.   Endocrine: Denies polyuria, polydipsia, heat/cold intolerance.   Hematologic/Lymph: Denies lymphadenopathy, easy bruising or easy bleeding.   Allergic/Immunologic: Denies rash, rhinitis      Medications:  Continuous Infusions:   lactated ringers 25 mL/hr at 11/21/18 1002    norepinephrine bitartrate-D5W 0.04 mcg/kg/min (11/21/18 1002)     Scheduled Meds:   chlorhexidine  15 mL Mouth/Throat BID    famotidine  20 mg Per NG tube Daily    piperacillin-tazobactam 4.5 g in dextrose 5 % 100 mL IVPB (ready to mix system)  4.5 g Intravenous Q12H     PRN Meds:.dextrose 50%, glucagon (human recombinant), insulin aspart U-100    OBJECTIVE:   Vital Signs (Most Recent):   Temp: 97 °F (36.1 °C) (11/21/18 1002)  Pulse: 92 (11/21/18 1002)  Resp: 20 (11/21/18 1002)  BP: (!) 112/56 (11/21/18 1002)  SpO2: 100 % (11/21/18 1002)    Vital Signs (24h Range):   Temp:  [97 °F (36.1 °C)-98.7 °F (37.1 °C)] 97 °F (36.1 °C)  Pulse:  [] 92  Resp:  [12-24] 20  SpO2:  [94 %-100 %] 100 %  BP:  (100-143)/(55-70) 112/56  Arterial Line BP: (104-145)/(49-70) 125/59    ICP/CPP (Last 24h):   CO:  [4.2 L/min-5 L/min] 4.2 L/min  CI:  [2.2 L/min/m2-2.6 L/min/m2] 2.2 L/min/m2    I & O (Last 24h):     Intake/Output Summary (Last 24 hours) at 11/21/2018 1026  Last data filed at 11/21/2018 1002  Gross per 24 hour   Intake 2022.19 ml   Output 765 ml   Net 1257.19 ml     Physical Exam:  GA: lethargic, comfortable, no acute distress.   HEENT: No scleral icterus or JVD. Neck supple  Pulmonary: Air entry equal to auscultation A/P/L. Wheezing no, crackles no  Cardiac: RRR, S1 & S2   Abdominal: soft, non-tender, bowel sounds present x 4. No appreciable hepatosplenomegaly.  Skin: No jaundice, rashes, or visible lesions.  Neuro:  --Mental Status:  Lethargic, no spontaneous eye opening during bedside exam.   --Pupils 2.5mm, reactive.   --Corneal reflex, gag, cough intact.  Minimal movement to noxious stimuli x4 ext  MSK:  Pitting edema in UE and LE  Absent right radial pulse - increased capillary refill    Vent Data:   Vent Mode: A/C  Oxygen Concentration (%):  [] 35  Resp Rate Total:  [15 br/min-25 br/min] 19 br/min  Vt Set:  [400 mL] 400 mL  PEEP/CPAP:  [5 cmH20] 5 cmH20  Mean Airway Pressure:  [9.5 rdX05-22 cmH20] 10 cmH20    Lines/Drains/Airway:        Airway - Non-Surgical 11/16/18 Endotracheal Tube (Active)   Secured at 23 cm 11/20/2018  8:03 PM   Measured At Lips 11/20/2018  8:03 PM   Secured Location Center 11/20/2018  8:03 PM   Secured by Commercial tube flor 11/20/2018  8:03 PM   Bite Block center 11/20/2018  8:03 PM   Site Condition Cool;Dry 11/20/2018  8:03 PM   Status Patent;Secured;Intact 11/20/2018  8:03 PM   Site Assessment Clean;Dry;No bleeding;No drainage 11/20/2018  8:03 PM   Cuff Pressure 26 cm H2O 11/20/2018  8:03 PM           Percutaneous Central Line Insertion/Assessment - triple lumen  11/15/18 1915 right femoral vein;right femoral (Active)   Dressing biopatch in place;dressing  reinforced;dressing dry and intact 11/20/2018  3:05 PM   Securement secured w/ sutures 11/20/2018  3:05 PM   Distal Patency/Care flushed w/o difficulty;infusing 11/20/2018  3:05 PM   Medial Patency/Care flushed w/o difficulty;infusing 11/20/2018  3:05 PM   Proximal Patency/Care flushed w/o difficulty;infusing 11/20/2018  3:05 PM   Waveform normal 11/20/2018  3:02 AM   Line Interventions line leveled/zeroed 11/20/2018  3:02 AM   Dressing Change Due 11/22/18 11/20/2018  3:05 PM   Daily Line Review Performed 11/20/2018  3:05 PM           Arterial Line 11/20/18 0100 Left Radial (Active)   Site Assessment Clean;Dry;Intact;No redness;No swelling 11/20/2018  3:05 PM   Line Status Pulsatile blood flow 11/20/2018  3:05 PM   Art Line Waveform Appropriate;Square wave test performed 11/20/2018  3:05 PM   Arterial Line Interventions Zeroed and calibrated;Leveled;Connections checked and tightened;Flushed per protocol 11/20/2018  3:05 PM   Color/Movement/Sensation Capillary refill greater than 3 sec 11/20/2018  3:05 PM   Dressing Type Transparent 11/20/2018  3:05 PM   Dressing Status Biopatch in place;Clean;Dry;Intact 11/20/2018  3:05 PM   Dressing Intervention New dressing 11/20/2018  3:05 PM   Dressing Change Due 11/27/18 11/20/2018  3:05 PM           NG/OG Tube 11/15/18 2000 18 Fr. Center mouth (Active)   Placement Check placement verified by aspirate characteristics;placement verified by aspirate pH;placement verified by distal tube length measurement 11/20/2018  3:05 PM   Distal Tube Length (cm) 57 11/20/2018  3:05 PM   Tolerance no signs/symptoms of discomfort 11/20/2018  3:05 PM   Securement taped to commercial device 11/20/2018  3:05 PM   Clamp Status/Tolerance clamped 11/20/2018  3:05 PM   Suction Setting/Drainage Method suction initiated at;low 11/16/2018  7:05 AM   Insertion Site Appearance no redness, warmth, tenderness, skin breakdown, drainage 11/20/2018  3:05 PM   Drainage None 11/20/2018  3:02 AM   Flush/Irrigation  "flushed w/;water;no resistance met 11/20/2018  3:05 PM   Feeding Method continuous 11/20/2018  3:05 PM   Feeding Action feeding continued 11/20/2018  3:05 PM   Current Rate (mL/hr) 10 mL/hr 11/20/2018  3:05 PM   Goal Rate (mL/hr) 45 mL/hr 11/20/2018  3:05 PM   Intake (mL) 60 mL 11/16/2018  8:30 PM   Water Bolus (mL) 30 mL 11/18/2018  9:05 PM   Formula Name Glucerna 11/20/2018  3:05 PM   Intake (mL) - Formula Tube Feeding 20 11/20/2018  9:02 PM   Residual Amount (ml) 0 ml 11/20/2018  3:05 PM            Rectal Tube 11/19/18 0758 rectal tube w/ balloon (indicate number of mLs) (Active)   Balloon Inflation Volume (mL) 45 11/20/2018  3:05 PM   Reposition drainage bags for BMS & Zavala on opposite sides of bed 11/20/2018  3:05 PM   Outcome gas expelled;gas expelled, stool evacuated;stool evacuated;comfort enhanced 11/20/2018  3:05 PM   Stool Color Brown 11/20/2018  3:05 PM   Insertion Site Appearance red 11/20/2018  3:05 PM   Flush/Irrigation flushed w/;water;no resistance met 11/20/2018  3:05 PM   Rectal Tube Output 150 mL 11/20/2018  6:00 PM            Urethral Catheter 11/20/18 1245 (Active)   Site Assessment Clean;Intact 11/20/2018  3:05 PM   Collection Container Urimeter 11/20/2018  3:05 PM   Securement Method secured to top of thigh w/ adhesive device 11/20/2018  3:05 PM   Catheter Care Performed yes 11/20/2018  3:05 PM   Reason for Continuing Urinary Catheterization Critically ill in ICU requiring intensive monitoring 11/20/2018  3:05 PM   CAUTI Prevention Bundle StatLock in place w 1" slack;Intact seal between catheter & drainage tubing;Drainage bag off the floor;Green sheeting clip in use;No dependent loops or kinks;Drainage bag not overfilled (<2/3 full);Drainage bag below bladder 11/20/2018  3:05 PM   Output (mL) 10 mL 11/20/2018  9:02 PM     Nutrition/Tube Feeds (if NPO state why): hold tf while on pressors     Labs:  ABG:   Recent Labs   Lab 11/21/18  0334   PH 7.373   PO2 139*   PCO2 26.5*   HCO3 15.4* "   POCSATURATED 99   BE -10     BMP:  Recent Labs   Lab 11/21/18  0140   *   K 4.7   *   CO2 13*   BUN 48*   CREATININE 2.3*   *   MG 2.3   PHOS 5.1*     LFT:   Lab Results   Component Value Date     (H) 11/21/2018    ALT 55 (H) 11/21/2018    ALKPHOS 72 11/21/2018    BILITOT 1.7 (H) 11/21/2018    ALBUMIN 1.2 (L) 11/21/2018    PROT 4.3 (L) 11/21/2018     CBC:   Lab Results   Component Value Date    WBC 13.97 (H) 11/21/2018    HGB 12.8 11/21/2018    HCT 37.8 11/21/2018    MCV 88 11/21/2018    PLT 95 (L) 11/21/2018     Microbiology x 7d:   Microbiology Results (last 7 days)     Procedure Component Value Units Date/Time    Blood culture [[id number]] Collected:  11/20/18 0610    Order Status:  Completed Specimen:  Blood from Peripheral, Antecubital, Left Updated:  11/21/18 1012     Blood Culture, Routine No Growth to date     Blood Culture, Routine No Growth to date    Narrative:       Blood cultures from 2 different sites. 4 bottles total.  Please draw before starting antibiotics.    Culture, Respiratory with Gram Stain [[id number]] Collected:  11/20/18 0420    Order Status:  Completed Specimen:  Respiratory from Sputum Updated:  11/21/18 0739     Respiratory Culture No Growth     Gram Stain (Respiratory) <10 epithelial cells per low power field.     Gram Stain (Respiratory) Rare WBC's     Gram Stain (Respiratory) No organisms seen    Blood culture [[id number]]     Order Status:  No result Specimen:  Blood         Imaging:  CXR  11/20 - bilateral atelectasis with pleural effusions.   I personally reviewed the above image.  Today I independently reviewed pertinent medications, lines/drains/airways, imaging, cardiology results, laboratory results, microbiology results, notably:   ASSESSMENT/PLAN:     Active Hospital Problems    Diagnosis    *Arterial ischemic stroke, multifocal, multiple vascular territories, acute    Elevated troponin    Alteration in skin integrity    Elevated serum creatinine     Acute hypoxemic respiratory failure    Seizure--> probably    Lactic acidosis    JIAME (acute kidney injury)    Acute deep vein thrombosis (DVT) of femoral vein of right lower extremity    Acute pulmonary embolism non occlusive right main pulmonary artery    Syncope      Neuro:   On cEEG- neg so far. Will continue overnight and d/c if no seizures captured. Hold off on AEDs for now  MRI brain demonstrating multifocal cerebral ischemia in various vascular territories. Suggestive of cardioembolic source.  Consult vascular neurology for stroke work up  Encephalopathy likely multifactorial - metabolic, ischemic stroke.  Check B12, folate, thiamine and RPR    Pulmonary:   Cont mech vent  CXR/ABG daily  History of PE, off anticoagulation due to recent retroperitoneal hematoma.     Cardiac:   S/p cardiac stent placement   Consult to cardiology   High troponin  Possible cardioembolic stroke  Repeat echocardiogram with contrast - reviewed with cardiology team; worsening LV and RV function compared to previous echo studies. Making a compelling indication for earlier anticoagulation however with increased bleeding risk (thrombocytopenia and recent retroperitoneal hematoma).   Antiplatelet on hold due to bleeding (retroperitoneal hematoma)  On levophed for hypotension  Continue flotrac for hemodynamic monitoring  Decrease LR, SVV < 12; to avoid fluid overload.     Renal:    Admitted with JAIME - expected and likely multifactorial (contrast exposure, hypotension)  Monitor I/O closely.     ID:   Urine culture 11/15 growing kleb and e.coli  On zosyn and vanc. Repeat cultures sent 11/20  Will follow. Consider d/c if cultures neg      Hem/Onc:   Stable hh  Thrombocytopenia - sepsis. Check HIT Ab  Haptoglobin-252, fibrinogen-650, LDH-627  Hold heparin due to recent retroperitoneal hematoma and thrombocytopenia.  LED - persistent right popliteal DVT    Endocrine:    BS stable   On insulin  regimen.    Fluids/Electrolytes/Nutrition/GI:   Tube feeds once off pressors  Replete lytes cautiously  Lines:  Art +  CVC +  ETT +  Zavala +  NG +  PEG NA    Proph:  DVT:SCD/ no heparin due to retroperitoneal hematoma  Constipation:  Last output:bowel regimen  PUP:pepcid  VAP:peridex       Following clinical event of right radial embolus confirmed on arterial doppler, I met with the patient son and daughter and discussed the complex clinical scenario of the risk and benefits of  Resuming or holding antithrombotics. In addition, repeat echocardiogram demonstrates worsening cardiac function. She is at risk of continued venous and arterial thromboembolism as well bleeding (due to thrombocytopenia and recent retroperitoneal hematoma).  They have opted to pursue comfort care at this time.    Uninterrupted Critical Care/Counseling Time (not including procedures):: 62 mins    Alireza Seay MD  Neurocritical care attending    Full Code    Alireza Seay MD  Neurocritical Care  [facility]

## 2018-11-21 NOTE — PROGRESS NOTES
Patient is an 77 yo female w/ past medical history as mentioned above and a complicated medical course at the OSH which included PCI w/ 4 stent placements on 10/15, retroperitoneal hematoma w/ significant Hgb drop requiring transfusion of multiple units of PRBCs, episode of non-responsiveness w/ hypotension requiring intubation and pressor support who was initially transferred to [facility] for higher level of care, vEEG as there was concern for NCSE. EEG has not been concerning for NCSE at this time, so further imagining modalities were pursued, which resulted in a discovery of multiple acute punctate infarcts in b/l hemispheres as well as R cerebellum.       Neuro ICU had goals of care discussion with family who decided to withdraw care. Patient currently transitioned to comfort care.    Patient's chart was reviewed by a stroke team provider. There are no new recommendations at this time. Stroke team will sign off. Discussed patient with staff. Please contact stroke team for any questions or concerns.             Nazanin De León PA-C  Vascular Neurology  [phone]

## 2018-11-21 NOTE — ASSESSMENT & PLAN NOTE
- Non-occlusive PE noted at the OSH  - No anticoagulation at this time as patient w/ large retroperitoneal hematoma requiring transfusions at OSH

## 2018-11-21 NOTE — PROGRESS NOTES
"[facility]  Neurocritical Care  Progress Note    Admit Date: 11/19/2018  Service Date: 11/20/2018  Length of Stay: 1    Subjective:     Chief Complaint: Seizure    History of Present Illness: 78 y F with mh of congestive heart failure, DM2, HTN who presents as transfer from [facility].    On 11/9, she presented to [facility] for seizure vs syncope. Evaluation in the emergency department revealed an elevated D-dimer and lactic acidosis.  Ultrasound of the lower extremities found lower extremity DVT inthe distal right femoral vein extending into and through the popliteal vein into the posterior tibial vein. Subsequent CTA revealed a nonocclusive pulmonary artery embolus. Patient admitted and anticoagulation initiated.  Evaluated by both Cardiology and Neurology.   Neurology had working diagnosis of recurrent spells suggestive of complex partial sz and generalized sz. Patient Initiated on depakote.   Cardiology ordered an echo which was normal EF.   Nuclear medicine stress test with reversible ischemia  On 11- a LHC was done. Since high grade stenosis was found in multiple coronary vessels, 4 stents were placed as below:  · Non-obstructed disease in the LAD  · HIgh-grade stenoses in the proximal and [id number] RCA successfully treated with PCI/two PARAS  · High-grade stenosis of the proximal PDA successfully treated with PCI/PARAS  · Hifh grade stenosis in the mid LCX successfully treated with PCI/PARAS  · Estimated blood loss: <50 mL     After cardiac stenting procedure, she had episode of unresponsiveness that was witnessed by the PACU nurse with stable HR, breathing, and BP but she was "staring off into space" and unresponsive. After transfer to the telemetry unit a rapid response called and patient had worsening responsivness, She was also hypotensive.  patient was intubated for unresponsive state. She was transferred to ICU and was started on Levophed and Phenylnephrine. Her " hospital course prior was complicated by hematoma in arm and anemia. Anemia worsened overnight and she was transfused with a total of 5 units PRBCs and stabilized. CT chest/head/& abdomen with findings of left anterior renal intraperitoneal soft tissue mass most consistent with hematoma given her clinical findings. Hgb/Hct stabilized, serial Hgb/Hct ongoing.      Neurological exam documented at OSH:  11/17/2018:  Remains intubated and critically ill though more stable than yesterday. She is now responding to name and following simple commands.   11-:  Remains critically ill, intubated on Vasopressin. Not opening eyes today to verbal stimuli or moving hands. Not on and sedating agents. Hgb/Hct again decreased. CT abdomen yesterday showed decreasing hematoma. She is not on any thinners outside of Plavix. UOP is marginal at best ( 30 ml/hr). Insulin gtt weaned off with stable glucoses and close following with SSI as needed. Enteral feedings ongoing via OGT. Remains on the vent on AC mode rate 16 and breathing over rate. Plan for potassium replacement and transfusion of 2 units PRBC. No active evidence of bleeding noted. Close following ongoing.   H&H appears stabilizing.        11/19/2018Patient was more responsive over we can but is minimally responsive today.  She is minimally responsive to sternal rub.  Currently not on sedatives but does open eyes with what appears to be right upper gaze. Staff reported again what might have appeared like seizure activity this morning with right upper gaze twitching mouth. Pupils are reactive.  Patient does appear to have corneal reflexes.  She is breathing over the vent     Transfer to neuro intensive care unit where EEG services are available    Hospital Course: [place]19[place] [facility]. Stat CTH, EEG monitoring  11/20: hypotension, EEG for concern of seizures. MRI brain with multifocal, multiple vascular territory ischemia. Multiorgan dysfunction on  admission.         Review of Symptoms: intubated, encephalopathic cannot participate.  Constitutional: Denies fevers, weight loss, chills, or weakness.   Eyes: Denies changes in vision.   ENT: Denies dysphagia, nasal discharge, ear pain or discharge.   Cardiovascular: Denies chest pain, palpitations, orthopnea, or claudication.   Respiratory: Denies shortness of breath, cough, hemoptysis, or wheezing.   GI: Denies nausea/vomitting, hematochezia, melena, abd pain, or changes in appetite.   : Denies dysuria, incontinence, or hematuria.   Musculoskeletal: Denies joint pain or myalgias.   Skin/breast: Denies rashes, lumps, lesions, or discharge.   Neurologic: Denies headache, dizziness, vertigo, or paresthesias.   Psychiatric: Denies changes in mood or hallucinations.   Endocrine: Denies polyuria, polydipsia, heat/cold intolerance.   Hematologic/Lymph: Denies lymphadenopathy, easy bruising or easy bleeding.   Allergic/Immunologic: Denies rash, rhinitis      Medications:  Continuous Infusions:   lactated ringers 25 mL/hr at 11/20/18 2102    norepinephrine bitartrate-D5W 0.09 mcg/kg/min (11/20/18 2102)     Scheduled Meds:   chlorhexidine  15 mL Mouth/Throat BID    famotidine  20 mg Per NG tube Daily    piperacillin-tazobactam 4.5 g in dextrose 5 % 100 mL IVPB (ready to mix system)  4.5 g Intravenous Q12H     PRN Meds:.dextrose 50%, glucagon (human recombinant), insulin aspart U-100    OBJECTIVE:   Vital Signs (Most Recent):   Temp: 98.1 °F (36.7 °C) (11/20/18 1902)  Pulse: 99 (11/20/18 2102)  Resp: 14 (11/20/18 2102)  BP: (!) 101/59 (11/20/18 2102)  SpO2: 100 % (11/20/18 2102)    Vital Signs (24h Range):   Temp:  [98.1 °F (36.7 °C)-98.7 °F (37.1 °C)] 98.1 °F (36.7 °C)  Pulse:  [] 99  Resp:  [0-29] 14  SpO2:  [100 %] 100 %  BP: ()/(53-70) 101/59  Arterial Line BP: (100-145)/(50-72) 113/50    ICP/CPP (Last 24h):   CO:  [4.3 L/min-5 L/min] 5 L/min  CI:  [2.2 L/min/m2-2.6 L/min/m2] 2.5 L/min/m2    I & O  (Last 24h):     Intake/Output Summary (Last 24 hours) at 11/20/2018 2118  Last data filed at 11/20/2018 2102  Gross per 24 hour   Intake 2792.5 ml   Output 745 ml   Net 2047.5 ml     Physical Exam:  GA: lethargic, comfortable, no acute distress.   HEENT: No scleral icterus or JVD. Neck supple  Pulmonary: Air entry equal to auscultation A/P/L. Wheezing no, crackles no  Cardiac: RRR, S1 & S2 w/o rubs/murmurs/gallops.   Abdominal: soft, non-tender, bowel sounds present x 4. No appreciable hepatosplenomegaly.  Skin: No jaundice, rashes, or visible lesions.  Neuro:  --Mental Status:  Lethargic, no spontaneous eye opening during bedside exam.   --Pupils 2.5mm, reactive.   --Corneal reflex, gag, cough intact.  Minimal movement to noxious stimuli x4 ext  MSK:  Pitting edema in UE and LE    Vent Data:   Vent Mode: A/C  Oxygen Concentration (%):  [] 36  Resp Rate Total:  [0 br/min-25 br/min] 18 br/min  Vt Set:  [400 mL] 400 mL  PEEP/CPAP:  [5 cmH20] 5 cmH20  Mean Airway Pressure:  [0 fmR61-32 cmH20] 11 cmH20    Lines/Drains/Airway:        Airway - Non-Surgical 11/16/18 Endotracheal Tube (Active)   Secured at 23 cm 11/20/2018  8:03 PM   Measured At Lips 11/20/2018  8:03 PM   Secured Location Center 11/20/2018  8:03 PM   Secured by Commercial tube flor 11/20/2018  8:03 PM   Bite Block center 11/20/2018  8:03 PM   Site Condition Cool;Dry 11/20/2018  8:03 PM   Status Patent;Secured;Intact 11/20/2018  8:03 PM   Site Assessment Clean;Dry;No bleeding;No drainage 11/20/2018  8:03 PM   Cuff Pressure 26 cm H2O 11/20/2018  8:03 PM           Percutaneous Central Line Insertion/Assessment - triple lumen  11/15/18 1915 right femoral vein;right femoral (Active)   Dressing biopatch in place;dressing reinforced;dressing dry and intact 11/20/2018  3:05 PM   Securement secured w/ sutures 11/20/2018  3:05 PM   Distal Patency/Care flushed w/o difficulty;infusing 11/20/2018  3:05 PM   Medial Patency/Care flushed w/o difficulty;infusing  11/20/2018  3:05 PM   Proximal Patency/Care flushed w/o difficulty;infusing 11/20/2018  3:05 PM   Waveform normal 11/20/2018  3:02 AM   Line Interventions line leveled/zeroed 11/20/2018  3:02 AM   Dressing Change Due 11/22/18 11/20/2018  3:05 PM   Daily Line Review Performed 11/20/2018  3:05 PM           Arterial Line 11/20/18 0100 Left Radial (Active)   Site Assessment Clean;Dry;Intact;No redness;No swelling 11/20/2018  3:05 PM   Line Status Pulsatile blood flow 11/20/2018  3:05 PM   Art Line Waveform Appropriate;Square wave test performed 11/20/2018  3:05 PM   Arterial Line Interventions Zeroed and calibrated;Leveled;Connections checked and tightened;Flushed per protocol 11/20/2018  3:05 PM   Color/Movement/Sensation Capillary refill greater than 3 sec 11/20/2018  3:05 PM   Dressing Type Transparent 11/20/2018  3:05 PM   Dressing Status Biopatch in place;Clean;Dry;Intact 11/20/2018  3:05 PM   Dressing Intervention New dressing 11/20/2018  3:05 PM   Dressing Change Due 11/27/18 11/20/2018  3:05 PM           NG/OG Tube 11/15/18 2000 18 Fr. Center mouth (Active)   Placement Check placement verified by aspirate characteristics;placement verified by aspirate pH;placement verified by distal tube length measurement 11/20/2018  3:05 PM   Distal Tube Length (cm) 57 11/20/2018  3:05 PM   Tolerance no signs/symptoms of discomfort 11/20/2018  3:05 PM   Securement taped to commercial device 11/20/2018  3:05 PM   Clamp Status/Tolerance clamped 11/20/2018  3:05 PM   Suction Setting/Drainage Method suction initiated at;low 11/16/2018  7:05 AM   Insertion Site Appearance no redness, warmth, tenderness, skin breakdown, drainage 11/20/2018  3:05 PM   Drainage None 11/20/2018  3:02 AM   Flush/Irrigation flushed w/;water;no resistance met 11/20/2018  3:05 PM   Feeding Method continuous 11/20/2018  3:05 PM   Feeding Action feeding continued 11/20/2018  3:05 PM   Current Rate (mL/hr) 10 mL/hr 11/20/2018  3:05 PM   Goal Rate (mL/hr) 45  "mL/hr 11/20/2018  3:05 PM   Intake (mL) 60 mL 11/16/2018  8:30 PM   Water Bolus (mL) 30 mL 11/18/2018  9:05 PM   Formula Name Guero 11/20/2018  3:05 PM   Intake (mL) - Formula Tube Feeding 20 11/20/2018  9:02 PM   Residual Amount (ml) 0 ml 11/20/2018  3:05 PM            Rectal Tube 11/19/18 0758 rectal tube w/ balloon (indicate number of mLs) (Active)   Balloon Inflation Volume (mL) 45 11/20/2018  3:05 PM   Reposition drainage bags for BMS & Zavala on opposite sides of bed 11/20/2018  3:05 PM   Outcome gas expelled;gas expelled, stool evacuated;stool evacuated;comfort enhanced 11/20/2018  3:05 PM   Stool Color Brown 11/20/2018  3:05 PM   Insertion Site Appearance red 11/20/2018  3:05 PM   Flush/Irrigation flushed w/;water;no resistance met 11/20/2018  3:05 PM   Rectal Tube Output 150 mL 11/20/2018  6:00 PM            Urethral Catheter 11/20/18 1245 (Active)   Site Assessment Clean;Intact 11/20/2018  3:05 PM   Collection Container Urimeter 11/20/2018  3:05 PM   Securement Method secured to top of thigh w/ adhesive device 11/20/2018  3:05 PM   Catheter Care Performed yes 11/20/2018  3:05 PM   Reason for Continuing Urinary Catheterization Critically ill in ICU requiring intensive monitoring 11/20/2018  3:05 PM   CAUTI Prevention Bundle StatLock in place w 1" slack;Intact seal between catheter & drainage tubing;Drainage bag off the floor;Green sheeting clip in use;No dependent loops or kinks;Drainage bag not overfilled (<2/3 full);Drainage bag below bladder 11/20/2018  3:05 PM   Output (mL) 10 mL 11/20/2018  9:02 PM     Nutrition/Tube Feeds (if NPO state why): hold tf while on pressors     Labs:  ABG:   Recent Labs   Lab 11/20/18  0404   PH 7.405   PO2 131*   PCO2 24.4*   HCO3 15.3*   POCSATURATED 99   BE -9     BMP:  Recent Labs   Lab 11/20/18  0201   *   K 4.4   *   CO2 14*   BUN 43*   CREATININE 2.2*   GLU 93   MG 2.3   PHOS 4.4     LFT:   Lab Results   Component Value Date     (H) 11/20/2018    " ALT 98 (H) 11/20/2018    ALKPHOS 70 11/20/2018    BILITOT 1.5 (H) 11/20/2018    ALBUMIN 1.5 (L) 11/20/2018    PROT 4.3 (L) 11/20/2018     CBC:   Lab Results   Component Value Date    WBC 11.13 11/20/2018    HGB 12.8 11/20/2018    HCT 37.5 11/20/2018    MCV 87 11/20/2018    PLT 67 (L) 11/20/2018     Microbiology x 7d:   Microbiology Results (last 7 days)     Procedure Component Value Units Date/Time    Blood culture [[id number]] Collected:  11/20/18 0610    Order Status:  Completed Specimen:  Blood from Peripheral, Antecubital, Left Updated:  11/20/18 1515     Blood Culture, Routine No Growth to date    Narrative:       Blood cultures from 2 different sites. 4 bottles total.  Please draw before starting antibiotics.    Culture, Respiratory with Gram Stain [[id number]] Collected:  11/20/18 0420    Order Status:  Completed Specimen:  Respiratory from Sputum Updated:  11/20/18 0956     Gram Stain (Respiratory) <10 epithelial cells per low power field.     Gram Stain (Respiratory) Rare WBC's     Gram Stain (Respiratory) No organisms seen    Blood culture [[id number]]     Order Status:  No result Specimen:  Blood         Imaging:  CXR  11/20 - bilateral atelectasis with pleural effusions.   I personally reviewed the above image.  Today I independently reviewed pertinent medications, lines/drains/airways, imaging, cardiology results, laboratory results, microbiology results, notably:   ASSESSMENT/PLAN:     Active Hospital Problems    Diagnosis    *Seizure--> probably    Elevated troponin    Alteration in skin integrity    Acute hypoxemic respiratory failure    Syncope      Neuro:   On cEEG- neg so far. Will continue overnight and d/c if no seizures captured. Hold off on AEDs for now  MRI brain demonstrating multifocal cerebral ischemia in various vascular territories. Suggestive of cardioembolic source.  Consult vascular neurology for stroke work up  Encephalopathy likely multifactorial - metabolic, ischemic  stroke.  Check B12, folate, thiamine and RPR    Pulmonary:   Cont mech vent  CXR/ABG daily  History of PE, off anticoagulation due to recent retroperitoneal hematoma.     Cardiac:   S/p cardiac stent placement   Consult to cardiology   High troponin  Possible cardioembolic stroke  Repeat echocardiogram with contrast to identify ventricular thrombus. May make a compelling indication for earlier anticoagulation.   Antiplatelet on hold due to bleeding (retroperitoneal hematoma)  On levophed for hypotension  Connect to flotrac for hemodynamic monitoring  Decrease LR, SVV < 12; to avoid fluid overload.     Renal:    Admitted with JAIME - expected and likely multifactorial (contrast exposure, hypotension)  Monitor I/O closely.     ID:   Urine culture 11/15 growing kleb and e.coli  On zosyn and vanc. Repeat cultures sent 11/20  Will follow. Consider d/c if cultures neg      Hem/Onc:   Stable hh  Thrombocytopenia - sepsis. Check HIT Ab  Check haptoglobin, fibrinogen, LDH  Hold heparin due to recent retroperitoneal hematoma and thrombocytopenia.  Repeat LED if DVT present consider placing IVC filter.    Endocrine:    BS stable   On insulin regimen.    Fluids/Electrolytes/Nutrition/GI:   Tube feeds once off pressors  Replete lytes cautiously  Lines:  Art +  CVC +  ETT +  Zavala +  NG +  PEG NA    Proph:  DVT:SCD/ no heparin due to retroperitoneal hematoma  Constipation:  Last output:bowel regimen  PUP:pepcid  VAP:peridex    Family updated at bedside. Discussed clinical course and ongoing work up to better understand etiology and current findings so far.    Uninterrupted Critical Care/Counseling Time (not including procedures):: 47 mins    Alireza Seay MD  Neurocritical care attending    Full Code    Alireza Seay MD  Neurocritical Care  [facility]

## 2018-11-21 NOTE — CONSULTS
Please see the consult noted from earlier this evening. Appreciate consult, will follow up w/ the patient.    Kiersten Bear MD    PGY-2  Vascular Neurology  [facility]

## 2018-11-21 NOTE — HPI
"Patient  is an 79 yo female w/ past medical history significant for HFpEF (EF 50-55%), DM2, HTN, Hx of recent DVT who initially presented to [facility] on 11/9 for evaluation of syncope. OSH w/u revealed a small non-occlusive PE in the R pulmonary a., as well as an abnormal nuclear stress EKG showing reversible ischemia w/ WMA. She underwent LHC with access via the R radial artery on 11/15 with 4 x PARAS placed in the RCA, LCX, and PDA. Several hours after the procedure patient was found to be unresponsive, requiring intubation and hypotensive requiring pressors. Prior to the intubation, per record review, patient had episode of AMS that was witnessed by the PACU nurse with stable HR, breathing, and BP but she was "staring off into space" w/o response to external stimuli.   During OSH hospitalization patient developed L arm and L flank hematoma, w/  Hgb dropping down to 3.9,requiring 5U PRBCs; also noted to become thrombocytopenic with platelets in the 70s from 193 on admit.   She was transferred to [facility] from OSH for EEG monitoring for suspected status epilepticus after above mentioned incidents.   EEG has been unrevealing thus far, prompting further brain imaging studies to be ordered - MRI brain w/ multiple scattered punctate areas of acute infarction involving both hemispheres and the right cerebellum.   Vascular Neurology consulted for evaluation of acute infarctions noted on the MRI.  Patient is intubated, no family at bedside to provide additional information. Hx obtained from previous records and primary team.     "

## 2018-11-21 NOTE — ASSESSMENT & PLAN NOTE
Patient is an 77 yo female w/ past medical history as mentioned above and a complicated medical course at the OSH which included PCI w/ 4 stent placements on 10/15, retroperitoneal hematoma w/ significant Hgb drop requiring transfusion of multiple units of PRBCs, episode of non-responsiveness w/ hypotension requiring intubation and pressor support who was initially transferred to [facility] for higher level of care, vEEG as there was concern for NCSE. EEG has not been concerning for NCSE at this time, so further imagining modalities were pursued, which resulted in a discovery of multiple acute punctate infarcts in b/l hemispheres as well as R cerebellum. Based on the location of the infarcts two etiologies are suspected, hypoperfusion resulting in watershed infarcts as well as cardioembolic origin of ischemia.    Would benefit from additional cardiac imaging to assess for any thrombi, as patient w/ akinetic portion of the inferior wall on ECHO on 11/09 but no mention of akinesis or any other WMA on repeat ECHO after stent placement on 11/16. Recommend permissive HTN for acute ischemic infarcts.   Patient currently off antioplatelets and anticoagulation in the setting of recent retroperitoneal bleed requiring transfusions.   Patient's AMS is also likely multifactorial in the setting of possible infection, hypernatreamia and other metabolic derangements.   Would recommend completing fulll stroke w/u, especially extracranial and intracranial vessel imaging once patient is more stable from medical perspective.     Antithrombotics for secondary stroke prevention: Antiplatelets: None: Hemorrhage any type  Anticoagulants: Heparin protocol without bolus  No Afib thus far, no evidence of throbus, although further evaluation is warranted as 11/08 ECHO w/ segmental WMA    Statins for secondary stroke prevention and hyperlipidemia, if present:   Statins: Atorvastatin 40 if LDL >70    Aggressive risk factor modification: HLD, Diet,  Exercise, Obesity, CAD     Rehab efforts: PT/OT/SLP to evaluate and treat when appropriate     Diagnostics ordered/pending: TTE to assess cardiac function/status     VTE prophylaxis: None: Reason for No Pharmacological VTE Prophylaxis: Mechanical prophylaxis: Place SCDs    BP parameters: Infarct: No intervention, SBP <220

## 2018-11-21 NOTE — SUBJECTIVE & OBJECTIVE
Past Medical History:   Diagnosis Date    CHF (congestive heart failure)     Diabetes mellitus     Hypertension      Past Surgical History:   Procedure Laterality Date    Left heart cath Left 11/15/2018    Performed by Harmeet Carlos MD at [facility] [place] CATH LAB    LEFT HEART CATHETERIZATION Left 11/15/2018    Procedure: Left heart cath;  Surgeon: Harmeet Carlos MD;  Location: [facility] [place] CATH LAB;  Service: Cardiology;  Laterality: Left;    Percutaneous coronary intervention N/A 11/15/2018    Performed by Harmeet Carlos MD at [facility] [place] CATH LAB     History reviewed. No pertinent family history.  Social History     Tobacco Use    Smoking status: Never Smoker   Substance Use Topics    Alcohol use: No     Frequency: Never    Drug use: No     Review of patient's allergies indicates:  No Known Allergies    Medications: I have reviewed the current medication administration record.    Medications Prior to Admission   Medication Sig Dispense Refill Last Dose    metFORMIN (GLUCOPHAGE) 1000 MG tablet Take 1,000 mg by mouth 2 (two) times daily with meals.       aspirin (ECOTRIN) 81 MG EC tablet Take 81 mg by mouth once daily.       atorvastatin (LIPITOR) 40 MG tablet Take 40 mg by mouth once daily.       carvedilol (COREG) 6.25 MG tablet Take 6.25 mg by mouth 2 (two) times daily with meals.       clopidogrel (PLAVIX) 75 mg tablet Take 75 mg by mouth once daily.       furosemide (LASIX) 40 MG tablet Take 40 mg by mouth 2 (two) times daily.       sacubitril-valsartan (ENTRESTO) 24-26 mg per tablet Take 1 tablet by mouth 2 (two) times daily.          Review of Systems   Unable to perform ROS: Intubated   Constitutional: Positive for activity change.   HENT: Negative.    Cardiovascular: Positive for leg swelling.   Gastrointestinal: Negative for blood in stool.   Endocrine: Negative.    Genitourinary: Positive for decreased urine volume.   Skin: Positive for wound.     Objective:     Vital Signs (Most Recent):  Temp: 98.1 °F (36.7  °C) (11/20/18 1902)  Pulse: 100 (11/20/18 2003)  Resp: 20 (11/20/18 2003)  BP: (!) 143/70 (11/20/18 2002)  SpO2: 100 % (11/20/18 2003)    Vital Signs Range (Last 24H):  Temp:  [98.1 °F (36.7 °C)-98.7 °F (37.1 °C)]   Pulse:  []   Resp:  [0-29]   BP: ()/(53-70)   SpO2:  [100 %]   Arterial Line BP: (100-145)/(50-72)     Physical Exam   Constitutional: She appears well-developed.   Intubated, non-responsive female w/ anasarca noted as well as skin tears/weaping wounds particularly on LUE   HENT:   Head: Normocephalic and atraumatic.   On EEG monitoring    Eyes:   B/L constricted pupils 2-3 mm, minimally responsive to light sluggish    Neck:   intubated   Cardiovascular: Regular rhythm.   Tachycardic    Pulmonary/Chest:   Intubated on vent    Abdominal: Soft.   Anasarca present   Musculoskeletal: She exhibits edema.   Skin:   Extremities cool to touch        Neurological Exam:   LOC: comatose  Attention Span: poor  Language: No aphasia, Intubated  Articulation: Untestable due to intubation  Orientation: Person, Place, Time , Untestable due to intubation  Visual Fields: Full  Untestable   EOM (CN III, IV, VI): Oculocephalic Reflex  Normal  Pupils (CN II, III): PERRL  Facial Sensation (CN V): Corneal reflex present Bilateral  Facial Movement (CN VII): Unable to test   Gag Reflex: absent  Reflexes: preserved  Motor: Arm left  Plegia 0/5  Leg left  Plegia 0/5  Arm right  Plegia 0/5  Leg right Paresis: 1/5  Cebellar: No evidence of appendicular or axial ataxia  Sensation: Untestable   Tone: Normal tone throughout      Laboratory:  CMP:   Recent Labs   Lab 11/20/18  0201   CALCIUM 7.9*   ALBUMIN 1.5*   PROT 4.3*   *   K 4.4   CO2 14*   *   BUN 43*   CREATININE 2.2*   ALKPHOS 70   ALT 98*   *   BILITOT 1.5*     CBC:   Recent Labs   Lab 11/20/18  1119   WBC 11.13   RBC 4.31   HGB 12.8   HCT 37.5   PLT 67*   MCV 87   MCH 29.7   MCHC 34.1     Lipid Panel: No results for input(s): CHOL, LDLCALC, HDL,  TRIG in the last 168 hours.  Coagulation:   Recent Labs   Lab 11/20/18  0201   INR 1.2   APTT 35.2*     Hgb A1C: No results for input(s): HGBA1C in the last 168 hours.  TSH: No results for input(s): TSH in the last 168 hours.    Diagnostic Results:      Brain imaging:  MRI w/o (11/20)    Scattered punctate areas of acute infarction involving both hemispheres and the right cerebellum.  Vessel Imaging:  N/A    Cardiac Evaluation:   TTE (11/16)  · Low normal left ventricular systolic function. The estimated ejection fraction is 50-55% (technically difficult)  · Normal LV diastolic function.  · Normal right ventricular systolic function    TTE (11/09)  · Left ventricle shows concentric remodeling.  · Left ventricle ejection fraction is at 55%  · Local segmental wall motion abnormalities (base of the inferior wall is akinetic)  · Grade I (mild) left ventricular diastolic dysfunction consistent with impaired relaxation.  · RV systolic function is normal.  · Mild aortic regurgitation.  · There is mild leaflet calcification of the Mitral Valve.  · Normal central venous pressure (3 mm Hg).

## 2018-11-21 NOTE — HOSPITAL COURSE
11/20/2018 Transferred from OSH to [facility] for vEEG as there was concern for NCSE. MRI w/ new acute infarcts, b/l hemispheres and R cerebellum

## 2018-11-21 NOTE — CONSULTS
Patient previously seen by cardiology consultation team. Plan of care performed by Dr. Ohara earlier today. Please see plan of care for recommendations. Call with questions or concerns.

## 2018-11-21 NOTE — NURSING
Cardiovascular  tech notified RN that pt's right radial artery was occluded. RN notified SHAYY Cm w/ PETER. Vascular surgery paged STAT.     0241 Echo being done at bedside.

## 2018-11-21 NOTE — PROCEDURES
ICU EEG/VIDEO MONITORING REPORT    Phyllis Dumont  [id number]  1940    DATE OF SERVICE: 11/20-21/18    DATE OF ADMISSION: 11/19/2018 10:46 PM    ADMITTING PROVIDER: Seamus Millard MD    REASON FOR CONSULT: 79yo F admitted with possible seizures    METHODOLOGY   Electroencephalographic (EEG) recording is with electrodes placed according to the International 10-20 placement system.  Thirty two (32) channels of digital signal are simultaneously recorded from the scalp and may include EKG, EMG, and/or eye monitors.   Recording band pass was 0.1 to 512 hz.  Digital video recording of the patient is simultaneously recorded with the EEG.  The nursing staff report clinical symptoms and may press an event button when the patient has symptoms of clinical interest to the treating physicians.  EEG and video recording is stored and archived in digital format.  The entire recording is visually reviewed and the times identified by computer analysis as being spikes or seizures are reviewed again.  Activation procedures which include photic stimulation, hyperventilation and instructing patients to perform simple task are done in selected patients.   Compresses spectral analysis (CSA) is also performed on the activity recorded from each individual channel.  This is displayed as a power display of frequencies from 0 to 30 Hz over time.   The CSA analysis is done and displayed continuously.  This is reviewed for asymmetries in power between homologous areas of the scalp and for presence of changes in power which canbe seen when seizures occur.  Sections of suspected abnormalities on the CSA is then compared with the original EEG recording.     [employer] software was also utilized in the review of this study.  This software suite analyzes the EEG recording in multiple domains.  Coherence and rhythmicity is computed to identify EEG sections which may contain organized seizures.  Each channel undergoes analysis to detect presence of  spike and sharp waves which have special and morphological characteristic of epileptic activity.  The routine EEG recording is converted from spacial into frequency domain.  This is then displayed comparing homologous areas to identify areas of significant asymmetry.  Algorithm to identify non-cortically generated artifact is used to separate eye movement, EMG and other artifact from the EEG.      Recording Times  Start on 11/20/18, 14:57  Stop on 11/21/18, 10:33    A total of 19 hours and 36 minutes of EEG was recorded.    EEG FINDINGS  Background activity:   The background rhythm was characterized by low amplitude (<20uV) delta (2-4 Hz) with superimposed theta (5-6 Hz)activity   No posterior dominant alpha rhythm at 30-70 microvolts.   Symmetry and continuity: the background was continuous and symmetric        Sleep:   Not seen.    Activation procedures:   Hyperventilation and photic stimulation were not performed    Abnormal activity:   No epileptiform discharges, periodic discharges, lateralized rhythmic delta activity or electrographic seizures were seen.    IMPRESSION:   This is an abnormal C-EEG due to the severe generalized slowing seen, suggestive of severe diffuse or multifocal cerebral dysfunction.  No epileptiform activity or electrographic seizures seen.    CLINICAL CORRELATION IS RECOMMENDED.    Sangita Aguirre MD, CLAY(), JOE LINDSAY.  Neurology-Epilepsy.  [facility].

## 2018-11-22 NOTE — SIGNIFICANT EVENT
Family made the decision to pursue comfort care earlier today and the process was initiated. Called to bedside by RN for asystole on the monitor. Bedside assessment was negative for heart or respiratory sounds. TOD: 0017. Message left with pt's daughter.     Kyree Llanes MD

## 2018-11-22 NOTE — DISCHARGE SUMMARY
Death Note  Critical Care Medicine      Admit Date: 2018    Date of Death: 2018    Time of Death: 001    Attending Physician: Alireza Seay MD    Principal Diagnoses: Arterial ischemic stroke, multifocal, multiple vascular territories, acute    Preliminary Cause of Death: Arterial ischemic stroke, multifocal, multiple vascular territories, acute    Secondary Diagnoses:   Active Hospital Problems    Diagnosis  POA    *Arterial ischemic stroke, multifocal, multiple vascular territories, acute [I63.89]  Yes    Elevated troponin [R74.8]  Yes    Alteration in skin integrity [R23.9]  Yes    Elevated serum creatinine [R79.89]  Yes    Acute hypoxemic respiratory failure [J96.01]  Yes    Seizure--> probably [R56.9]  Yes    Lactic acidosis [E87.2]  Yes    JAIME (acute kidney injury) [N17.9]  Yes    Acute deep vein thrombosis (DVT) of femoral vein of right lower extremity [I82.411]  Yes    Acute pulmonary embolism non occlusive right main pulmonary artery [I26.99]  Yes    Syncope [R55]  Yes      Resolved Hospital Problems   No resolved problems to display.        Discharged Condition:     HPI:  78 y F with mh of congestive heart failure, DM2, HTN who presents as transfer from [facility].    On , she presented to [facility] for seizure vs syncope. Evaluation in the emergency department revealed an elevated D-dimer and lactic acidosis.  Ultrasound of the lower extremities found lower extremity DVT inthe distal right femoral vein extending into and through the popliteal vein into the posterior tibial vein. Subsequent CTA revealed a nonocclusive pulmonary artery embolus. Patient admitted and anticoagulation initiated.  Evaluated by both Cardiology and Neurology.   Neurology had working diagnosis of recurrent spells suggestive of complex partial sz and generalized sz. Patient Initiated on depakote.   Cardiology ordered an echo which was normal EF.   Nuclear medicine stress  "test with reversible ischemia  On 11- a LHC was done. Since high grade stenosis was found in multiple coronary vessels, 4 stents were placed as below:  · Non-obstructed disease in the LAD  · HIgh-grade stenoses in the proximal and ocv-so-jtgeok RCA successfully treated with PCI/two PARAS  · High-grade stenosis of the proximal PDA successfully treated with PCI/PARAS  · Hifh grade stenosis in the mid LCX successfully treated with PCI/PARAS  · Estimated blood loss: <50 mL     After cardiac stenting procedure, she had episode of unresponsiveness that was witnessed by the PACU nurse with stable HR, breathing, and BP but she was "staring off into space" and unresponsive. After transfer to the telemetry unit a rapid response called and patient had worsening responsivness, She was also hypotensive.  patient was intubated for unresponsive state. She was transferred to ICU and was started on Levophed and Phenylnephrine. Her hospital course prior was complicated by hematoma in arm and anemia. Anemia worsened overnight and she was transfused with a total of 5 units PRBCs and stabilized. CT chest/head/& abdomen with findings of left anterior renal intraperitoneal soft tissue mass most consistent with hematoma given her clinical findings. Hgb/Hct stabilized, serial Hgb/Hct ongoing.      Neurological exam documented at OSH:  11/17/2018:  Remains intubated and critically ill though more stable than yesterday. She is now responding to name and following simple commands.   11-:  Remains critically ill, intubated on Vasopressin. Not opening eyes today to verbal stimuli or moving hands. Not on and sedating agents. Hgb/Hct again decreased. CT abdomen yesterday showed decreasing hematoma. She is not on any thinners outside of Plavix. UOP is marginal at best ( 30 ml/hr). Insulin gtt weaned off with stable glucoses and close following with SSI as needed. Enteral feedings ongoing via OGT. Remains on the vent on AC mode rate 16 and " breathing over rate. Plan for potassium replacement and transfusion of 2 units PRBC. No active evidence of bleeding noted. Close following ongoing.   H&H appears stabilizing.        11/19/2018Patient was more responsive over we can but is minimally responsive today.  She is minimally responsive to sternal rub.  Currently not on sedatives but does open eyes with what appears to be right upper gaze. Staff reported again what might have appeared like seizure activity this morning with right upper gaze twitching mouth. Pupils are reactive.  Patient does appear to have corneal reflexes.  She is breathing over the vent     Transfer to neuro intensive care unit where EEG services are available    Hospital/ICU Course:  [place]19[place] [facility] [place]. Stat CTH, EEG monitoring  11/20: hypotension, EEG for concern of seizures. MRI brain with multifocal, multiple vascular territory ischemia. Multiorgan dysfunction on admission.   11/21: loss of right radial arterial pulse. Repeat echocardiogram - result pending. EEG neg for seizures.        Consultations were held with the family regarding the patient's expected poor prognosis. At the direction of the family, the patient was extubated and measures to ensure the comfort of the patient including, but not limited to, morphine as needed for pain and air hunger as well as benzodiazepines as needed for agitation. The patient was subsequently declared dead.            Kyree Llanes MD  [facility]

## 2018-11-23 LAB
BACTERIA SPEC AEROBE CULT: NO GROWTH
GRAM STN SPEC: NORMAL

## 2018-11-25 LAB — BACTERIA BLD CULT: NORMAL
